# Patient Record
Sex: MALE | Race: WHITE | NOT HISPANIC OR LATINO | Employment: OTHER | URBAN - METROPOLITAN AREA
[De-identification: names, ages, dates, MRNs, and addresses within clinical notes are randomized per-mention and may not be internally consistent; named-entity substitution may affect disease eponyms.]

---

## 2023-11-01 ENCOUNTER — TELEPHONE (OUTPATIENT)
Dept: GASTROENTEROLOGY | Facility: CLINIC | Age: 68
End: 2023-11-01

## 2023-11-01 ENCOUNTER — CONSULT (OUTPATIENT)
Dept: GASTROENTEROLOGY | Facility: CLINIC | Age: 68
End: 2023-11-01
Payer: MEDICARE

## 2023-11-01 VITALS
SYSTOLIC BLOOD PRESSURE: 150 MMHG | DIASTOLIC BLOOD PRESSURE: 90 MMHG | BODY MASS INDEX: 31.64 KG/M2 | WEIGHT: 221 LBS | HEIGHT: 70 IN

## 2023-11-01 DIAGNOSIS — Z87.898 HISTORY OF DYSPHAGIA: ICD-10-CM

## 2023-11-01 DIAGNOSIS — Z86.010 HISTORY OF COLON POLYPS: ICD-10-CM

## 2023-11-01 DIAGNOSIS — K21.9 GASTROESOPHAGEAL REFLUX DISEASE, UNSPECIFIED WHETHER ESOPHAGITIS PRESENT: ICD-10-CM

## 2023-11-01 DIAGNOSIS — K64.9 HEMORRHOIDS, UNSPECIFIED HEMORRHOID TYPE: ICD-10-CM

## 2023-11-01 DIAGNOSIS — K62.5 RECTAL BLEEDING: Primary | ICD-10-CM

## 2023-11-01 PROCEDURE — 99204 OFFICE O/P NEW MOD 45 MIN: CPT | Performed by: NURSE PRACTITIONER

## 2023-11-01 RX ORDER — LEVOTHYROXINE SODIUM 88 UG/1
88 TABLET ORAL DAILY
COMMUNITY

## 2023-11-01 RX ORDER — POLYETHYLENE GLYCOL 3350 17 G/17G
238 POWDER, FOR SOLUTION ORAL ONCE
Qty: 238 G | Refills: 0 | Status: SHIPPED | OUTPATIENT
Start: 2023-11-01 | End: 2023-11-01

## 2023-11-01 RX ORDER — FENOFIBRATE 48 MG/1
48 TABLET, COATED ORAL DAILY
COMMUNITY

## 2023-11-01 RX ORDER — PROPRANOLOL HCL 60 MG
60 CAPSULE, EXTENDED RELEASE 24HR ORAL DAILY
COMMUNITY

## 2023-11-01 RX ORDER — OMEPRAZOLE 20 MG/1
20 CAPSULE, DELAYED RELEASE ORAL DAILY
COMMUNITY

## 2023-11-01 RX ORDER — SIMVASTATIN 20 MG
20 TABLET ORAL
COMMUNITY

## 2023-11-01 NOTE — TELEPHONE ENCOUNTER
Scheduled date of colonoscopy (as of today): 11/7/23  Physician performing colonoscopy:Dr Marj Valdez  Location of colonoscopy: 11/7/23  Bowel prep reviewed with patient: miralax  Instructions reviewed with patient by: gave patient instructions  Clearances: No

## 2023-11-01 NOTE — PROGRESS NOTES
[facility] - Outpatient Consultation  Vanessa Ladd 70 y.o. male MRN: [id number]  Encounter: [id number]    ASSESSMENT AND PLAN:      1. Rectal bleeding  2. Hemorrhoids, unspecified hemorrhoid type  Patient states he has been experiencing rectal bleeding for quite some time and he is aware he does have external hemorrhoids. He has been using witch hazel wipes. Patient states he has been also recently seen increased blood in the toilet bowl. Patient states since starting Kegel exercises he noticed a decrease in the mucoid old drainage that has required him to wear a pad as well. Bleeding likely related to patient's hemorrhoids and less likely diverticular related. - Colonoscopy scheduled at [facility])  - CBC and differential; Future  - Adequate fiber and fluids, patient education on adequate fiber intake attached to discharge summary  -Continue Kegel exercises. ,  May consider anal manometry if symptoms persist or worsen.  -Can use Preparation H OTC as directed, also Tucks pads. 3.  Gastroesophageal reflux disease  4. History of dysphagia  Patient states he had an EGD years ago after having episode of dysphagia. States he has not had any difficulty with swallowing or acid reflux symptoms. He is currently taking omeprazole 20 mg daily and denies breakthrough or alarm symptoms. 5. History of colon polyps  Colonoscopy 2015; patient states his colonoscopy was done in the [facility] and he believes he was told he had polyps and hemorrhoids. - Colonoscopy scheduled at [facility]  - polyethylene glycol (MiraLax) 17 GM/SCOOP powder; Take 238 g by mouth once for 1 dose Take 238 g my mouth.  Use as directed  Dispense: 238 g; Refill: 0      Followup Appointment: 3 weeks after procedures  ______________________________________________________________________    Chief Complaint   Patient presents with    Rectal Bleeding       HPI:   79-year-old male with no significant medical history presents with rectal bleeding and mucoid discharge after defecation. Patient states he has been putting off being seen for rectal bleeding for quite some time. Patient states he does believe he has an external hemorrhoid. He states recently he has been noting increased blood in the toilet bowl. He also states he has to wear a pad due to the mucoid all drainage after defecation. Patient denies taking anticoagulation medications or increased NSAID use. Patient states he has started doing Kegel exercises and believes they are helping to reduce the mucus that he was noticing after bowel movements and present in the pads he is wearing. On exam, patient denies nausea, vomiting or abdominal pain. States his acid reflux symptoms are well controlled with omeprazole 20 mg daily. States he is having daily normal bowel movements. Denies melena. Patient's last colonoscopy was 2015 in the [facility]. He believes he did have hemorrhoids and polyps at that time. Historical Information   Past Medical History:   Diagnosis Date    Clotting disorder ([address]) 2015    Occasional Blood in Stool; Recent Mucus following Bowel Movements    Colon polyp 2015    Removed during last Colon    Esophageal stricture 2002    Upper GI Endoscopy - corrected?     GERD (gastroesophageal reflux disease) 2002    Under Control with Meds    Hernia 1977    Corrective Surgery 1977     Past Surgical History:   Procedure Laterality Date    COLONOSCOPY  2015    [address] [facility]    UPPER GASTROINTESTINAL ENDOSCOPY  2002     Social History     Substance and Sexual Activity   Alcohol Use Yes    Alcohol/week: 2.0 standard drinks of alcohol    Types: 2 Cans of beer per week    Comment: Occasional cocktail - special event     Social History     Substance and Sexual Activity   Drug Use Never     Social History     Tobacco Use   Smoking Status Former    Packs/day: 1.50    Years: 20.00    Total pack years: 30.00    Types: Cigarettes    Start date: 1972    Quit date: 1994    Years since quittin.8   Smokeless Tobacco Never     Family History   Problem Relation Age of Onset    Colon polyps Father         Benign    Diabetes Father         Managed    Stroke Father     Stomach cancer Maternal Grandmother     Cancer Brother         Prostate    Colon polyps Brother         Benign    Diabetes Brother         Injections    Early death Brother         Autopsy not performed (age 54)    Mental illness Brother         PTSD ([employer] [facility])       Meds/Allergies     Current Outpatient Medications:     fenofibrate (TRICOR) 48 mg tablet    levothyroxine (Synthroid) 88 mcg tablet    omeprazole (PriLOSEC) 20 mg delayed release capsule    polyethylene glycol (MiraLax) 17 GM/SCOOP powder    propranolol (INDERAL LA) 60 mg 24 hr capsule    simvastatin (ZOCOR) 20 mg tablet    No Known Allergies    PHYSICAL EXAM:    Blood pressure 150/90, height 5' 10" (1.778 m), weight 100 kg (221 lb). Body mass index is 31.71 kg/m². Normal exam    General Appearance: NAD, cooperative, alert  Eyes: Anicteric, PERRLA, EOMI  ENT:  Normocephalic, atraumatic, normal mucosa. Neck:  Supple, symmetrical, trachea midline,   Resp:  Clear to auscultation bilaterally; no rales, rhonchi or wheezing; respirations unlabored   CV:  S1 S2, Regular rate and rhythm; no murmur, rub, or gallop. GI:  Soft, non-tender, non-distended; normal bowel sounds; no masses, no organomegaly   Rectal: Deferred  Musculoskeletal: No cyanosis, clubbing or edema. Normal ROM.   Skin:  No jaundice, rashes, or lesions   Heme/Lymph: No palpable cervical lymphadenopathy  Psych: Normal affect, good eye contact  Neuro: No gross deficits, AAOx3    Lab Results:   No results found for: "WBC", "HGB", "HCT", "MCV", "PLT"  No results found for: "NA", "K", "CL", "CO2", "ANIONGAP", "BUN", "CREATININE", "GLUCOSE", "GLUF", "CALCIUM", "CORRECTEDCA", "AST", "ALT", "ALKPHOS", "PROT", "BILITOT", "EGFR"  No results found for: "IRON", "TIBC", "FERRITIN"  No results found for: "LIPASE"    Radiology Results:   No results found. REVIEW OF SYSTEMS:    CONSTITUTIONAL: Denies any fever, chills, rigors, and weight loss. HEENT: No earache or tinnitus. Denies hearing loss or visual disturbances. CARDIOVASCULAR: No chest pain or palpitations. RESPIRATORY: Denies any cough, hemoptysis, shortness of breath or dyspnea on exertion. GASTROINTESTINAL: As noted in the History of Present Illness. GENITOURINARY: No problems with urination. Denies any hematuria or dysuria. NEUROLOGIC: No dizziness or vertigo, denies headaches. MUSCULOSKELETAL: Denies any muscle or joint pain. SKIN: Denies skin rashes or itching. ENDOCRINE: Denies excessive thirst. Denies intolerance to heat or cold. PSYCHOSOCIAL: Denies depression or anxiety. Denies any recent memory loss.

## 2023-11-01 NOTE — TELEPHONE ENCOUNTER
Pt is scheduled for November 7, 2023 with Dr Luis Keen in [address] [place] [address]

## 2023-11-01 NOTE — PATIENT INSTRUCTIONS
20 to 25 g fiber per day  Adequate fluid foods    May use Preparation H over-the-counter or Tucks pads/witch hazel as well.

## 2023-11-01 NOTE — H&P (VIEW-ONLY)
[facility] - Outpatient Consultation  Krissy Winkler 70 y.o. male MRN: [id number]  Encounter: [id number]    ASSESSMENT AND PLAN:      1. Rectal bleeding  2. Hemorrhoids, unspecified hemorrhoid type  Patient states he has been experiencing rectal bleeding for quite some time and he is aware he does have external hemorrhoids. He has been using witch hazel wipes. Patient states he has been also recently seen increased blood in the toilet bowl. Patient states since starting Kegel exercises he noticed a decrease in the mucoid old drainage that has required him to wear a pad as well. Bleeding likely related to patient's hemorrhoids and less likely diverticular related. - Colonoscopy scheduled at [facility])  - CBC and differential; Future  - Adequate fiber and fluids, patient education on adequate fiber intake attached to discharge summary  -Continue Kegel exercises. ,  May consider anal manometry if symptoms persist or worsen.  -Can use Preparation H OTC as directed, also Tucks pads. 3.  Gastroesophageal reflux disease  4. History of dysphagia  Patient states he had an EGD years ago after having episode of dysphagia. States he has not had any difficulty with swallowing or acid reflux symptoms. He is currently taking omeprazole 20 mg daily and denies breakthrough or alarm symptoms. 5. History of colon polyps  Colonoscopy 2015; patient states his colonoscopy was done in the [facility] and he believes he was told he had polyps and hemorrhoids. - Colonoscopy scheduled at [facility]  - polyethylene glycol (MiraLax) 17 GM/SCOOP powder; Take 238 g by mouth once for 1 dose Take 238 g my mouth.  Use as directed  Dispense: 238 g; Refill: 0      Followup Appointment: 3 weeks after procedures  ______________________________________________________________________    Chief Complaint   Patient presents with    Rectal Bleeding       HPI:   68-year-old male with no significant medical history presents with rectal bleeding and mucoid discharge after defecation. Patient states he has been putting off being seen for rectal bleeding for quite some time. Patient states he does believe he has an external hemorrhoid. He states recently he has been noting increased blood in the toilet bowl. He also states he has to wear a pad due to the mucoid all drainage after defecation. Patient denies taking anticoagulation medications or increased NSAID use. Patient states he has started doing Kegel exercises and believes they are helping to reduce the mucus that he was noticing after bowel movements and present in the pads he is wearing. On exam, patient denies nausea, vomiting or abdominal pain. States his acid reflux symptoms are well controlled with omeprazole 20 mg daily. States he is having daily normal bowel movements. Denies melena. Patient's last colonoscopy was 2015 in the [facility]. He believes he did have hemorrhoids and polyps at that time. Historical Information   Past Medical History:   Diagnosis Date    Clotting disorder ([address]) 2015    Occasional Blood in Stool; Recent Mucus following Bowel Movements    Colon polyp 2015    Removed during last Colon    Esophageal stricture 2002    Upper GI Endoscopy - corrected?     GERD (gastroesophageal reflux disease) 2002    Under Control with Meds    Hernia 1977    Corrective Surgery 1977     Past Surgical History:   Procedure Laterality Date    COLONOSCOPY  2015    [address] [facility]    UPPER GASTROINTESTINAL ENDOSCOPY  2002     Social History     Substance and Sexual Activity   Alcohol Use Yes    Alcohol/week: 2.0 standard drinks of alcohol    Types: 2 Cans of beer per week    Comment: Occasional cocktail - special event     Social History     Substance and Sexual Activity   Drug Use Never     Social History     Tobacco Use   Smoking Status Former    Packs/day: 1.50    Years: 20.00    Total pack years: 30.00    Types: Cigarettes    Start date: 1972    Quit date: 1994    Years since quittin.8   Smokeless Tobacco Never     Family History   Problem Relation Age of Onset    Colon polyps Father         Benign    Diabetes Father         Managed    Stroke Father     Stomach cancer Maternal Grandmother     Cancer Brother         Prostate    Colon polyps Brother         Benign    Diabetes Brother         Injections    Early death Brother         Autopsy not performed (age 54)    Mental illness Brother         PTSD ([employer] [facility] [employer] [facility])       Meds/Allergies     Current Outpatient Medications:     fenofibrate (TRICOR) 48 mg tablet    levothyroxine (Synthroid) 88 mcg tablet    omeprazole (PriLOSEC) 20 mg delayed release capsule    polyethylene glycol (MiraLax) 17 GM/SCOOP powder    propranolol (INDERAL LA) 60 mg 24 hr capsule    simvastatin (ZOCOR) 20 mg tablet    No Known Allergies    PHYSICAL EXAM:    Blood pressure 150/90, height 5' 10" (1.778 m), weight 100 kg (221 lb). Body mass index is 31.71 kg/m². Normal exam    General Appearance: NAD, cooperative, alert  Eyes: Anicteric, PERRLA, EOMI  ENT:  Normocephalic, atraumatic, normal mucosa. Neck:  Supple, symmetrical, trachea midline,   Resp:  Clear to auscultation bilaterally; no rales, rhonchi or wheezing; respirations unlabored   CV:  S1 S2, Regular rate and rhythm; no murmur, rub, or gallop. GI:  Soft, non-tender, non-distended; normal bowel sounds; no masses, no organomegaly   Rectal: Deferred  Musculoskeletal: No cyanosis, clubbing or edema. Normal ROM.   Skin:  No jaundice, rashes, or lesions   Heme/Lymph: No palpable cervical lymphadenopathy  Psych: Normal affect, good eye contact  Neuro: No gross deficits, AAOx3    Lab Results:   No results found for: "WBC", "HGB", "HCT", "MCV", "PLT"  No results found for: "NA", "K", "CL", "CO2", "ANIONGAP", "BUN", "CREATININE", "GLUCOSE", "GLUF", "CALCIUM", "CORRECTEDCA", "AST", "ALT", "ALKPHOS", "PROT", "BILITOT", "EGFR"  No results found for: "IRON", "TIBC", "FERRITIN"  No results found for: "LIPASE"    Radiology Results:   No results found. REVIEW OF SYSTEMS:    CONSTITUTIONAL: Denies any fever, chills, rigors, and weight loss. HEENT: No earache or tinnitus. Denies hearing loss or visual disturbances. CARDIOVASCULAR: No chest pain or palpitations. RESPIRATORY: Denies any cough, hemoptysis, shortness of breath or dyspnea on exertion. GASTROINTESTINAL: As noted in the History of Present Illness. GENITOURINARY: No problems with urination. Denies any hematuria or dysuria. NEUROLOGIC: No dizziness or vertigo, denies headaches. MUSCULOSKELETAL: Denies any muscle or joint pain. SKIN: Denies skin rashes or itching. ENDOCRINE: Denies excessive thirst. Denies intolerance to heat or cold. PSYCHOSOCIAL: Denies depression or anxiety. Denies any recent memory loss.

## 2023-11-02 LAB
BASOPHILS # BLD AUTO: 0.1 X10E3/UL (ref 0–0.2)
BASOPHILS NFR BLD AUTO: 1 %
EOSINOPHIL # BLD AUTO: 0.3 X10E3/UL (ref 0–0.4)
EOSINOPHIL NFR BLD AUTO: 4 %
ERYTHROCYTE [DISTWIDTH] IN BLOOD BY AUTOMATED COUNT: 15.1 % (ref 11.6–15.4)
HCT VFR BLD AUTO: 46.8 % (ref 37.5–51)
HGB BLD-MCNC: 14.7 G/DL (ref 13–17.7)
IMM GRANULOCYTES # BLD: 0 X10E3/UL (ref 0–0.1)
IMM GRANULOCYTES NFR BLD: 1 %
LYMPHOCYTES # BLD AUTO: 1.5 X10E3/UL (ref 0.7–3.1)
LYMPHOCYTES NFR BLD AUTO: 25 %
MCH RBC QN AUTO: 27.4 PG (ref 26.6–33)
MCHC RBC AUTO-ENTMCNC: 31.4 G/DL (ref 31.5–35.7)
MCV RBC AUTO: 87 FL (ref 79–97)
MONOCYTES # BLD AUTO: 0.6 X10E3/UL (ref 0.1–0.9)
MONOCYTES NFR BLD AUTO: 9 %
NEUTROPHILS # BLD AUTO: 3.7 X10E3/UL (ref 1.4–7)
NEUTROPHILS NFR BLD AUTO: 60 %
PLATELET # BLD AUTO: 247 X10E3/UL (ref 150–450)
RBC # BLD AUTO: 5.36 X10E6/UL (ref 4.14–5.8)
WBC # BLD AUTO: 6.2 X10E3/UL (ref 3.4–10.8)

## 2023-11-07 ENCOUNTER — APPOINTMENT (OUTPATIENT)
Dept: LAB | Facility: HOSPITAL | Age: 68
End: 2023-11-07
Payer: MEDICARE

## 2023-11-07 ENCOUNTER — HOSPITAL ENCOUNTER (OUTPATIENT)
Dept: CT IMAGING | Facility: HOSPITAL | Age: 68
Discharge: HOME | End: 2023-11-07
Attending: INTERNAL MEDICINE
Payer: MEDICARE

## 2023-11-07 ENCOUNTER — HOSPITAL ENCOUNTER (OUTPATIENT)
Dept: GASTROENTEROLOGY | Facility: AMBULATORY SURGERY CENTER | Age: 68
Discharge: HOME | End: 2023-11-07
Payer: MEDICARE

## 2023-11-07 ENCOUNTER — ANESTHESIA EVENT (OUTPATIENT)
Dept: GASTROENTEROLOGY | Facility: AMBULATORY SURGERY CENTER | Age: 68
End: 2023-11-07

## 2023-11-07 ENCOUNTER — NURSE TRIAGE (OUTPATIENT)
Age: 68
End: 2023-11-07

## 2023-11-07 ENCOUNTER — ANESTHESIA (OUTPATIENT)
Dept: GASTROENTEROLOGY | Facility: AMBULATORY SURGERY CENTER | Age: 68
End: 2023-11-07

## 2023-11-07 VITALS
HEART RATE: 82 BPM | RESPIRATION RATE: 24 BRPM | SYSTOLIC BLOOD PRESSURE: 126 MMHG | TEMPERATURE: 97 F | WEIGHT: 221 LBS | OXYGEN SATURATION: 96 % | HEIGHT: 70 IN | BODY MASS INDEX: 31.64 KG/M2 | DIASTOLIC BLOOD PRESSURE: 67 MMHG

## 2023-11-07 DIAGNOSIS — C18.7 MALIGNANT NEOPLASM OF SIGMOID COLON (HCC): ICD-10-CM

## 2023-11-07 DIAGNOSIS — C18.7 MALIGNANT NEOPLASM OF SIGMOID COLON (HCC): Primary | ICD-10-CM

## 2023-11-07 DIAGNOSIS — Z86.010 HISTORY OF COLON POLYPS: ICD-10-CM

## 2023-11-07 DIAGNOSIS — K62.5 RECTAL BLEEDING: ICD-10-CM

## 2023-11-07 LAB
ANION GAP SERPL CALCULATED.3IONS-SCNC: 9 MMOL/L
BUN SERPL-MCNC: 10 MG/DL (ref 5–25)
CALCIUM SERPL-MCNC: 9.4 MG/DL (ref 8.4–10.2)
CHLORIDE SERPL-SCNC: 102 MMOL/L (ref 96–108)
CO2 SERPL-SCNC: 26 MMOL/L (ref 21–32)
CREAT SERPL-MCNC: 0.96 MG/DL (ref 0.6–1.3)
GFR SERPL CREATININE-BSD FRML MDRD: 80 ML/MIN/1.73SQ M
GLUCOSE SERPL-MCNC: 101 MG/DL (ref 65–140)
POTASSIUM SERPL-SCNC: 3.3 MMOL/L (ref 3.5–5.3)
SODIUM SERPL-SCNC: 137 MMOL/L (ref 135–147)

## 2023-11-07 PROCEDURE — 45381 COLONOSCOPY SUBMUCOUS NJX: CPT | Performed by: INTERNAL MEDICINE

## 2023-11-07 PROCEDURE — 45380 COLONOSCOPY AND BIOPSY: CPT | Performed by: INTERNAL MEDICINE

## 2023-11-07 PROCEDURE — 71260 CT THORAX DX C+: CPT

## 2023-11-07 PROCEDURE — G1004 CDSM NDSC: HCPCS

## 2023-11-07 PROCEDURE — 80048 BASIC METABOLIC PNL TOTAL CA: CPT

## 2023-11-07 PROCEDURE — 74177 CT ABD & PELVIS W/CONTRAST: CPT

## 2023-11-07 PROCEDURE — 88341 IMHCHEM/IMCYTCHM EA ADD ANTB: CPT | Performed by: PATHOLOGY

## 2023-11-07 PROCEDURE — 36415 COLL VENOUS BLD VENIPUNCTURE: CPT

## 2023-11-07 PROCEDURE — 88342 IMHCHEM/IMCYTCHM 1ST ANTB: CPT | Performed by: PATHOLOGY

## 2023-11-07 PROCEDURE — 88305 TISSUE EXAM BY PATHOLOGIST: CPT | Performed by: PATHOLOGY

## 2023-11-07 RX ORDER — ACETAMINOPHEN 500 MG
500 TABLET ORAL EVERY 6 HOURS PRN
COMMUNITY

## 2023-11-07 RX ORDER — SODIUM CHLORIDE, SODIUM LACTATE, POTASSIUM CHLORIDE, CALCIUM CHLORIDE 600; 310; 30; 20 MG/100ML; MG/100ML; MG/100ML; MG/100ML
50 INJECTION, SOLUTION INTRAVENOUS CONTINUOUS
Status: DISCONTINUED | OUTPATIENT
Start: 2023-11-07 | End: 2023-11-11 | Stop reason: HOSPADM

## 2023-11-07 RX ORDER — PROPOFOL 10 MG/ML
INJECTION, EMULSION INTRAVENOUS AS NEEDED
Status: DISCONTINUED | OUTPATIENT
Start: 2023-11-07 | End: 2023-11-07

## 2023-11-07 RX ADMIN — PROPOFOL 50 MG: 10 INJECTION, EMULSION INTRAVENOUS at 11:14

## 2023-11-07 RX ADMIN — PROPOFOL 100 MG: 10 INJECTION, EMULSION INTRAVENOUS at 11:10

## 2023-11-07 RX ADMIN — SODIUM CHLORIDE, SODIUM LACTATE, POTASSIUM CHLORIDE, CALCIUM CHLORIDE 50 ML/HR: 600; 310; 30; 20 INJECTION, SOLUTION INTRAVENOUS at 10:48

## 2023-11-07 RX ADMIN — PROPOFOL 100 MG: 10 INJECTION, EMULSION INTRAVENOUS at 11:20

## 2023-11-07 RX ADMIN — SODIUM CHLORIDE, SODIUM LACTATE, POTASSIUM CHLORIDE, CALCIUM CHLORIDE: 600; 310; 30; 20 INJECTION, SOLUTION INTRAVENOUS at 11:27

## 2023-11-07 RX ADMIN — IOHEXOL 100 ML: 350 INJECTION, SOLUTION INTRAVENOUS at 16:25

## 2023-11-07 RX ADMIN — PROPOFOL 100 MG: 10 INJECTION, EMULSION INTRAVENOUS at 11:09

## 2023-11-07 NOTE — ANESTHESIA PREPROCEDURE EVALUATION
Procedure:  COLONOSCOPY    Relevant Problems   No relevant active problems      GERD  HLD  H/o esophageal stricture  Hypothyroid  AMAN + Cpap    Physical Exam    Airway    Mallampati score: II  TM Distance: <3 FB  Neck ROM: full     Dental   Comment: None loose     Cardiovascular      Pulmonary      Other Findings        Anesthesia Plan  ASA Score- 2     Anesthesia Type- IV sedation with anesthesia with ASA Monitors. Additional Monitors:     Airway Plan:     Comment: Last of PO bowel prep: 06:15    Patient educated on the possibility for awareness under sedation and of the possibility of airway intervention in the event of an airway or procedural emergency  . Plan Factors-Exercise tolerance (METS): >4 METS. Chart reviewed. Patient summary reviewed. Patient is not a current smoker. Induction- intravenous. Postoperative Plan-     Informed Consent- Anesthetic plan and risks discussed with patient. I personally reviewed this patient with the CRNA. Discussed and agreed on the Anesthesia Plan with the CRNA. Efrain Sepulveda

## 2023-11-07 NOTE — INTERVAL H&P NOTE
H&P reviewed. After examining the patient I find no changes in the patients condition since the H&P had been written.     Vitals:    11/07/23 1038   BP: 149/82   Pulse: 84   Resp: 20   Temp: (!) 97 °F (36.1 °C)   SpO2: 97%

## 2023-11-07 NOTE — ANESTHESIA POSTPROCEDURE EVALUATION
Post-Op Assessment Note    CV Status:  Stable  Pain Score: 0    Pain management: adequate     Mental Status:  Alert and awake   Hydration Status:  Euvolemic   PONV Controlled:  Controlled   Airway Patency:  Patent      Post Op Vitals Reviewed: Yes      Staff: CRNA         No notable events documented.     BP   111/68   Temp  98   Pulse  74   Resp   16   SpO2   93

## 2023-11-07 NOTE — TELEPHONE ENCOUNTER
Patient called in stating he is scheduled for stat CT scan at [address] [place] [address] 4 pm. He was at facility earlier today for lab draw and states he was provided with container labeled sterile water for CT prep. I advised patient report to hospital now and if problem with prep they will address it at radiology department, while on call with patient his wife was on with radiology and he stated everything is straightened out now and appreciated the help. Answer Assessment - Initial Assessment Questions  1. REASON FOR CALL or QUESTION: "What is your reason for calling today?" or "How can I best help you?" or "What question do you have that I can help answer?"      Patient called regarding issue with prep provided by radiology dept for CT scan scheduled today. Protocols used:  Information Only Call - No Triage-ADULT-[place]

## 2023-11-09 PROCEDURE — 88341 IMHCHEM/IMCYTCHM EA ADD ANTB: CPT | Performed by: PATHOLOGY

## 2023-11-09 PROCEDURE — 88342 IMHCHEM/IMCYTCHM 1ST ANTB: CPT | Performed by: PATHOLOGY

## 2023-11-09 PROCEDURE — 88305 TISSUE EXAM BY PATHOLOGIST: CPT | Performed by: PATHOLOGY

## 2023-11-10 ENCOUNTER — PATIENT OUTREACH (OUTPATIENT)
Dept: HEMATOLOGY ONCOLOGY | Facility: CLINIC | Age: 68
End: 2023-11-10

## 2023-11-10 DIAGNOSIS — C18.7 MALIGNANT NEOPLASM OF SIGMOID COLON (HCC): Primary | ICD-10-CM

## 2023-11-10 NOTE — TELEPHONE ENCOUNTER
Patients GI provider:  Dr. Asif Left    Number to return call: [phone]    Reason for call: Pt calling to advise doc got back to him. Wanted to call back and apologize for the disconnect.     Scheduled procedure/appointment date if applicable: Apt 25/07/0184

## 2023-11-10 NOTE — PROGRESS NOTES
Very pleasant 71 yo male who was referred to us by Dr. Gerda Melendrez, [address] [place] [address] location, for diagnosis of colon cancer. The pt's path report shows "at least intramucosal adenocarcinoma" and I wanted to proceed w referring the pt to see one of our colorectal surgeons to set him up for a FS followed by office visit (consult) for management of his new cancer. The pt has already completed CT CAP and no signs of definitive mets noted. When calling the pt I introduced myself and explained I work in care coordination. When explaining what the next steps in his work up included (CEA level and FS) he mentioned he was not really sure if he would be cont treatment w our physicians at [address] He mentioned knowing about a surgeon (Dr. Chiquis Simpson) and his desire to go to [facility]. I did mention to him that if it was location that is an issue we do have a cancer center down at the  region and we have a surgeon who works in that location too that we could connect him to rather than [facility]. He was unsure and therefore I explained that I could touch base w him on Monday and give him the weekend to think things over and do his research before making his decision. The pt was agreeable to this plan and stated he would f/u w me on Mon, he took my contact information .

## 2023-11-10 NOTE — TELEPHONE ENCOUNTER
Pt calling in for CT and bioipsy results. He would like to speak with Dr. Rosibel Kieta for plan of care.  Please call pt back

## 2023-11-13 ENCOUNTER — PATIENT OUTREACH (OUTPATIENT)
Dept: HEMATOLOGY ONCOLOGY | Facility: CLINIC | Age: 68
End: 2023-11-13

## 2023-11-13 NOTE — PROGRESS NOTES
Phone outreach to f/u w the pt today to see where he has decided to further pursue tx. The pt states that he is going to be seen at "[facility]" and he will be continuing his care closer to where he lives. He thanked me for my help, I told him to reach out if things change. He has my contact info.

## 2023-11-15 ENCOUNTER — TELEPHONE (OUTPATIENT)
Age: 68
End: 2023-11-15

## 2023-11-15 NOTE — TELEPHONE ENCOUNTER
Patients GI provider:  Dr. Smith [place]    Number to return call: ( [phone]    Reason for call: Pt calling asking for immerging reports from ct and colonoscopy from  11/7/2023. Pt is asking for this on a disc to take to surgeon.      Scheduled procedure/appointment date if applicable: Apt/procedure

## 2023-11-16 NOTE — TELEPHONE ENCOUNTER
Please advise patient to call Radiology at [phone] to request the disk for CT. I sent a message to Manjeet Daley to find out about the colonoscopy records.

## 2023-11-20 PROBLEM — C18.7 MALIGNANT NEOPLASM OF SIGMOID COLON (CMS/HCC): Status: ACTIVE | Noted: 2023-11-20

## 2023-11-20 NOTE — ASSESSMENT & PLAN NOTE
Adenocarcinoma of the sigmoid colon    CT: 11/7/2023    FFC: 11/7/2023      He is a cancer of the distal sigmoid colon.  There is a tattoo immediately distal to this.  The plan will be for a low anterior resection anastomosis could be above the rectovesicular fold.  There is no sign of any metastatic disease we will look at his CT scan there is a lot of edema of the sigmoid colon which looks like previous diverticulitis.  The tumor itself did not look too sizable or advanced endoscopically or radiographically.    I speak with him about the risk of bleed infection abscess incontinence anastomotic leak bladder and sexual function even death.  He understands wished to proceed we will make arrangements for this.

## 2023-11-21 ENCOUNTER — PREP FOR CASE (OUTPATIENT)
Dept: COLORECTAL SURGERY | Facility: CLINIC | Age: 68
End: 2023-11-21

## 2023-11-21 ENCOUNTER — OFFICE VISIT (OUTPATIENT)
Dept: COLORECTAL SURGERY | Facility: CLINIC | Age: 68
End: 2023-11-21
Payer: MEDICARE

## 2023-11-21 VITALS — HEIGHT: 70 IN | WEIGHT: 225 LBS | BODY MASS INDEX: 32.21 KG/M2

## 2023-11-21 DIAGNOSIS — C18.7 MALIGNANT NEOPLASM OF SIGMOID COLON (CMS/HCC): ICD-10-CM

## 2023-11-21 DIAGNOSIS — C18.7 MALIGNANT NEOPLASM OF SIGMOID COLON (CMS/HCC): Primary | ICD-10-CM

## 2023-11-21 PROCEDURE — 99205 OFFICE O/P NEW HI 60 MIN: CPT | Mod: 25 | Performed by: COLON & RECTAL SURGERY

## 2023-11-21 PROCEDURE — 45330 DIAGNOSTIC SIGMOIDOSCOPY: CPT | Performed by: COLON & RECTAL SURGERY

## 2023-11-21 RX ORDER — BENZONATATE 100 MG/1
CAPSULE ORAL
COMMUNITY
Start: 2023-11-17

## 2023-11-21 RX ORDER — PROPRANOLOL HYDROCHLORIDE 40 MG/5ML
SOLUTION ORAL
COMMUNITY
Start: 2005-01-01 | End: 2023-12-05

## 2023-11-21 RX ORDER — OMEPRAZOLE 20 MG/1
20 CAPSULE, DELAYED RELEASE ORAL
COMMUNITY
Start: 2023-09-26

## 2023-11-21 RX ORDER — DOXYCYCLINE 100 MG/1
CAPSULE ORAL
COMMUNITY
Start: 2023-11-14 | End: 2023-12-05

## 2023-11-21 RX ORDER — FLUTICASONE PROPIONATE 50 MCG
2 SPRAY, SUSPENSION (ML) NASAL DAILY
COMMUNITY

## 2023-11-21 RX ORDER — LEVOTHYROXINE SODIUM 88 UG/1
88 TABLET ORAL
COMMUNITY
Start: 2023-09-28

## 2023-11-21 RX ORDER — DOXYCYCLINE 100 MG/1
CAPSULE ORAL
Qty: 2 CAPSULE | Refills: 0 | Status: SHIPPED | OUTPATIENT
Start: 2023-11-21 | End: 2023-12-05

## 2023-11-21 RX ORDER — FENOFIBRATE 48 MG/1
48 TABLET ORAL DAILY
COMMUNITY
Start: 2023-09-28

## 2023-11-21 ASSESSMENT — ENCOUNTER SYMPTOMS
MUSCULOSKELETAL NEGATIVE: 1
PSYCHIATRIC NEGATIVE: 1
CARDIOVASCULAR NEGATIVE: 1
GASTROINTESTINAL NEGATIVE: 1
RESPIRATORY NEGATIVE: 1
CONSTITUTIONAL NEGATIVE: 1

## 2023-11-21 NOTE — H&P (VIEW-ONLY)
"History and Physical    Patient was referred by his daughter in law Amber Leslie.   The diagnosis on referral was  primary cancer of the  sigmoid colon.    The presenting symptom was  draingae of the  anus.    HPI:    Mr. Soliz is a 68 year old man here for evaluation of newly diagnosed sigmoid adenocarcinoma. He was due for a colonoscopy in 2025 however this summer began to have heavy mucus drainage from his anus, sometimes staining through his underwear. He has always had occasional blood after wiping however the mucus was new as of a few months ago. He was concerned and scheduled a colonoscopy. They found a mass in his sigmoid colon which was tattooed distally. The pathology showed \"atleast intramucosal adenocarcinoma\". He has two soft bowel movements daily without straining which is unchanged. He does not have any fecal incontinence or issues with control. He has not had any abdominal pain or weight loss.     Family history significant for brother with prostate cancer, father with gastric cancer (?)    Studies included: FFC: 11/7/23 CT Scan:11/7/23  Be:    GI issues included:   No nausea,  no vomiting, no bloating, no abdominal pain, bowel habits were unchanged, weight loss none,  Patient goes to the bathroom twice daily .  Patient experiences fecal accidents  never,    Delivery History:  ,  not applicable,    Pelvic Floor Disorders:  None,    PMH:  Previous Cancer:  No  Site of Previous Cancer: Not Applicable  Previous Pelvic Radiation:  No,   other      Cardiovascular History: MI:  No,  Angina:  No, CHF:  No,  HTN:  No,  Valve Disease:  No,  AFIB  No,  Other: No    Pulmonary Disease: COPD:  No,  Asthma:  No, Emphysema:  No, Pulmonary HTN:  No,  Other: no    Endocrine:  IDDM:  No,  NIDDM:  No, Other: no    Coagulation Disorders:  DVT:  no, Located in the N/A,  Pulmonary Embolus:  No, Clotting Factor Deficiency:  No, Thrombocytopenia:  No,      Morbid Obesity:  Yes  Elevated Cholesterol:  Yes    PSH " (ABDOMINAL): inguinal hernia repair.     MEDICATIONS:     Current Outpatient Medications:   •  benzonatate (TESSALON) 100 mg capsule, take 1 capsule by mouth three times a day if needed for cough, Disp: , Rfl:   •  doxycycline hyclate (VIBRAMYCIN) 100 mg capsule, take 1 capsule by mouth every 12 hours, Disp: , Rfl:   •  fluticasone propionate (FLONASE) 50 mcg/actuation nasal spray, Administer 2 sprays into affected nostril(s) daily., Disp: , Rfl:   •  levothyroxine (SYNTHROID) 88 mcg tablet, , Disp: , Rfl:   •  omeprazole (PriLOSEC) 20 mg capsule, , Disp: , Rfl:   •  propranoloL 40 mg/5 mL (8 mg/mL) solution, , Disp: , Rfl:   •  simvastatin 20 mg/5 mL (4 mg/mL) suspension, , Disp: , Rfl:   •  TRICOR 48 mg tablet, , Disp: , Rfl:     DRUG ALLERGIES:   No Known Allergies    FAMILY HISTORY:  Family History   Problem Relation Age of Onset   • Stroke Biological Father    • Diabetes Biological Father    • Prostate cancer Biological Brother    • Stomach cancer Maternal Grandmother        SOCIAL HISTORY:  Smoking History:   quit,  Cigarettes in 1995    Alcohol Use: Number of drinks per week: occasional     Social History     Socioeconomic History   • Marital status:      Spouse name: Not on file   • Number of children: Not on file   • Years of education: Not on file   • Highest education level: Not on file   Occupational History   • Not on file   Tobacco Use   • Smoking status: Former     Types: Cigarettes   • Smokeless tobacco: Never   Substance and Sexual Activity   • Alcohol use: Yes   • Drug use: Not on file   • Sexual activity: Not on file   Other Topics Concern   • Not on file   Social History Narrative   • Not on file     Social Determinants of Health     Financial Resource Strain: Not on file   Food Insecurity: Not on file   Transportation Needs: Not on file   Physical Activity: Not on file   Stress: Not on file   Social Connections: Not on file   Intimate Partner Violence: Not on file   Housing Stability: Not  on file       Review of Systems   Constitutional: Negative.    HENT: Negative.    Respiratory: Negative.    Cardiovascular: Negative.    Gastrointestinal: Negative.    Genitourinary: Negative.    Musculoskeletal: Negative.    Skin: Negative.    Psychiatric/Behavioral: Negative.        Physical Exam    Abdominal Exam:   Soft, nontender, diastasis   Perineal Inspection: Normal appearance  Digital Rectal Exam: Normal tone  Fiberoptic Flexible Sigmoidoscopy: Shows an area of tattoo distal to the lesion the tattoo appears to be above the rectosigmoid      IMAGING: Is reviewed there is no evidence personally there is no evidence of metastatic disease to the liver there is some thickening of the sigmoid colon no obvious area of tumor.    Assessment and Plan:   Malignant neoplasm of sigmoid colon (CMS/HCC)  Adenocarcinoma of the sigmoid colon    CT: 11/7/2023    FFC: 11/7/2023      He is a cancer of the distal sigmoid colon.  There is a tattoo immediately distal to this.  The plan will be for a low anterior resection anastomosis could be above the rectovesicular fold.  There is no sign of any metastatic disease we will look at his CT scan there is a lot of edema of the sigmoid colon which looks like previous diverticulitis.  The tumor itself did not look too sizable or advanced endoscopically or radiographically.    I speak with him about the risk of bleed infection abscess incontinence anastomotic leak bladder and sexual function even death.  He understands wished to proceed we will make arrangements for this.        We will have him obtain medical clearance. We will plan for anterior resection/

## 2023-11-21 NOTE — PROGRESS NOTES
"History and Physical    Patient was referred by his daughter in law Amber Leslie.   The diagnosis on referral was  primary cancer of the  sigmoid colon.    The presenting symptom was  draingae of the  anus.    HPI:    Mr. Soliz is a 68 year old man here for evaluation of newly diagnosed sigmoid adenocarcinoma. He was due for a colonoscopy in 2025 however this summer began to have heavy mucus drainage from his anus, sometimes staining through his underwear. He has always had occasional blood after wiping however the mucus was new as of a few months ago. He was concerned and scheduled a colonoscopy. They found a mass in his sigmoid colon which was tattooed distally. The pathology showed \"atleast intramucosal adenocarcinoma\". He has two soft bowel movements daily without straining which is unchanged. He does not have any fecal incontinence or issues with control. He has not had any abdominal pain or weight loss.     Family history significant for brother with prostate cancer, father with gastric cancer (?)    Studies included: FFC: 11/7/23 CT Scan:11/7/23  Be:    GI issues included:   No nausea,  no vomiting, no bloating, no abdominal pain, bowel habits were unchanged, weight loss none,  Patient goes to the bathroom twice daily .  Patient experiences fecal accidents  never,    Delivery History:  ,  not applicable,    Pelvic Floor Disorders:  None,    PMH:  Previous Cancer:  No  Site of Previous Cancer: Not Applicable  Previous Pelvic Radiation:  No,   other      Cardiovascular History: MI:  No,  Angina:  No, CHF:  No,  HTN:  No,  Valve Disease:  No,  AFIB  No,  Other: No    Pulmonary Disease: COPD:  No,  Asthma:  No, Emphysema:  No, Pulmonary HTN:  No,  Other: no    Endocrine:  IDDM:  No,  NIDDM:  No, Other: no    Coagulation Disorders:  DVT:  no, Located in the N/A,  Pulmonary Embolus:  No, Clotting Factor Deficiency:  No, Thrombocytopenia:  No,      Morbid Obesity:  Yes  Elevated Cholesterol:  Yes    PSH " (ABDOMINAL): inguinal hernia repair.     MEDICATIONS:     Current Outpatient Medications:     benzonatate (TESSALON) 100 mg capsule, take 1 capsule by mouth three times a day if needed for cough, Disp: , Rfl:     doxycycline hyclate (VIBRAMYCIN) 100 mg capsule, take 1 capsule by mouth every 12 hours, Disp: , Rfl:     fluticasone propionate (FLONASE) 50 mcg/actuation nasal spray, Administer 2 sprays into affected nostril(s) daily., Disp: , Rfl:     levothyroxine (SYNTHROID) 88 mcg tablet, , Disp: , Rfl:     omeprazole (PriLOSEC) 20 mg capsule, , Disp: , Rfl:     propranoloL 40 mg/5 mL (8 mg/mL) solution, , Disp: , Rfl:     simvastatin 20 mg/5 mL (4 mg/mL) suspension, , Disp: , Rfl:     TRICOR 48 mg tablet, , Disp: , Rfl:     DRUG ALLERGIES:   No Known Allergies    FAMILY HISTORY:  Family History   Problem Relation Age of Onset    Stroke Biological Father     Diabetes Biological Father     Prostate cancer Biological Brother     Stomach cancer Maternal Grandmother        SOCIAL HISTORY:  Smoking History:   quit,  Cigarettes in 1995    Alcohol Use: Number of drinks per week: occasional     Social History     Socioeconomic History    Marital status:      Spouse name: Not on file    Number of children: Not on file    Years of education: Not on file    Highest education level: Not on file   Occupational History    Not on file   Tobacco Use    Smoking status: Former     Types: Cigarettes    Smokeless tobacco: Never   Substance and Sexual Activity    Alcohol use: Yes    Drug use: Not on file    Sexual activity: Not on file   Other Topics Concern    Not on file   Social History Narrative    Not on file     Social Determinants of Health     Financial Resource Strain: Not on file   Food Insecurity: Not on file   Transportation Needs: Not on file   Physical Activity: Not on file   Stress: Not on file   Social Connections: Not on file   Intimate Partner Violence: Not on file   Housing Stability: Not  on file       Review of Systems   Constitutional: Negative.    HENT: Negative.    Respiratory: Negative.    Cardiovascular: Negative.    Gastrointestinal: Negative.    Genitourinary: Negative.    Musculoskeletal: Negative.    Skin: Negative.    Psychiatric/Behavioral: Negative.        Physical Exam    Abdominal Exam:   Soft, nontender, diastasis   Perineal Inspection: Normal appearance  Digital Rectal Exam: Normal tone  Fiberoptic Flexible Sigmoidoscopy: Shows an area of tattoo distal to the lesion the tattoo appears to be above the rectosigmoid      IMAGING: Is reviewed there is no evidence personally there is no evidence of metastatic disease to the liver there is some thickening of the sigmoid colon no obvious area of tumor.    Assessment and Plan:   Malignant neoplasm of sigmoid colon (CMS/HCC)  Adenocarcinoma of the sigmoid colon    CT: 11/7/2023    FFC: 11/7/2023      He is a cancer of the distal sigmoid colon.  There is a tattoo immediately distal to this.  The plan will be for a low anterior resection anastomosis could be above the rectovesicular fold.  There is no sign of any metastatic disease we will look at his CT scan there is a lot of edema of the sigmoid colon which looks like previous diverticulitis.  The tumor itself did not look too sizable or advanced endoscopically or radiographically.    I speak with him about the risk of bleed infection abscess incontinence anastomotic leak bladder and sexual function even death.  He understands wished to proceed we will make arrangements for this.        We will have him obtain medical clearance. We will plan for anterior resection/

## 2023-12-05 ENCOUNTER — APPOINTMENT (OUTPATIENT)
Dept: LAB | Facility: HOSPITAL | Age: 68
End: 2023-12-05
Attending: COLON & RECTAL SURGERY
Payer: MEDICARE

## 2023-12-05 ENCOUNTER — OFFICE VISIT (OUTPATIENT)
Dept: PREADMISSION TESTING | Facility: HOSPITAL | Age: 68
End: 2023-12-05
Attending: COLON & RECTAL SURGERY
Payer: MEDICARE

## 2023-12-05 VITALS
TEMPERATURE: 97.4 F | SYSTOLIC BLOOD PRESSURE: 161 MMHG | RESPIRATION RATE: 16 BRPM | DIASTOLIC BLOOD PRESSURE: 83 MMHG | BODY MASS INDEX: 30.64 KG/M2 | HEIGHT: 70 IN | OXYGEN SATURATION: 97 % | HEART RATE: 56 BPM | WEIGHT: 214 LBS

## 2023-12-05 DIAGNOSIS — C18.7 MALIGNANT NEOPLASM OF SIGMOID COLON (CMS/HCC): ICD-10-CM

## 2023-12-05 DIAGNOSIS — E03.9 HYPOTHYROIDISM, UNSPECIFIED TYPE: ICD-10-CM

## 2023-12-05 DIAGNOSIS — G47.33 OBSTRUCTIVE SLEEP APNEA ON CPAP: ICD-10-CM

## 2023-12-05 DIAGNOSIS — Z01.818 PREOP EXAMINATION: Primary | ICD-10-CM

## 2023-12-05 DIAGNOSIS — E78.00 HYPERCHOLESTEROLEMIA: ICD-10-CM

## 2023-12-05 PROBLEM — G25.0 ESSENTIAL TREMOR: Status: ACTIVE | Noted: 2022-04-26

## 2023-12-05 PROBLEM — D50.9 MICROCYTIC HYPOCHROMIC ANEMIA: Status: ACTIVE | Noted: 2023-12-05

## 2023-12-05 PROBLEM — J30.2 SEASONAL ALLERGIES: Status: ACTIVE | Noted: 2021-10-06

## 2023-12-05 PROBLEM — D64.9 ANEMIA: Status: ACTIVE | Noted: 2023-12-05

## 2023-12-05 PROBLEM — Z77.29 EXPOSURE TO POTENTIALLY HAZARDOUS SUBSTANCE: Status: ACTIVE | Noted: 2023-12-05

## 2023-12-05 PROBLEM — R03.0 PREHYPERTENSION: Status: ACTIVE | Noted: 2023-12-05

## 2023-12-05 PROBLEM — M19.90 ARTHRITIS: Status: ACTIVE | Noted: 2023-12-05

## 2023-12-05 PROBLEM — M19.90 OSTEOARTHRITIS: Status: ACTIVE | Noted: 2023-12-05

## 2023-12-05 PROBLEM — R06.09 OTHER DYSPNEA AND RESPIRATORY ABNORMALITY: Status: ACTIVE | Noted: 2023-12-05

## 2023-12-05 PROBLEM — N52.9 MALE ERECTILE DISORDER: Status: ACTIVE | Noted: 2023-12-05

## 2023-12-05 PROBLEM — R09.89 OTHER DYSPNEA AND RESPIRATORY ABNORMALITY: Status: ACTIVE | Noted: 2023-12-05

## 2023-12-05 PROBLEM — G47.30 SLEEP APNEA, UNSPECIFIED: Status: ACTIVE | Noted: 2023-12-05

## 2023-12-05 LAB
ABO + RH BLD: NORMAL
ALBUMIN SERPL-MCNC: 4.5 G/DL (ref 3.5–5.7)
ALP SERPL-CCNC: 44 IU/L (ref 34–125)
ALT SERPL-CCNC: 13 IU/L (ref 7–52)
ANION GAP SERPL CALC-SCNC: 7 MEQ/L (ref 3–15)
AST SERPL-CCNC: 16 IU/L (ref 13–39)
ATRIAL RATE: 48
BASOPHILS # BLD: 0.04 K/UL (ref 0.01–0.1)
BASOPHILS NFR BLD: 0.5 %
BILIRUB SERPL-MCNC: 0.5 MG/DL (ref 0.3–1.2)
BLD GP AB SCN SERPL QL: NEGATIVE
BLOOD BANK CMNT PATIENT-IMP: NORMAL
BUN SERPL-MCNC: 11 MG/DL (ref 7–25)
CALCIUM SERPL-MCNC: 9.7 MG/DL (ref 8.6–10.3)
CHLORIDE SERPL-SCNC: 103 MEQ/L (ref 98–107)
CO2 SERPL-SCNC: 28 MEQ/L (ref 21–31)
CREAT SERPL-MCNC: 1 MG/DL (ref 0.7–1.3)
D AG BLD QL: POSITIVE
DIFFERENTIAL METHOD BLD: ABNORMAL
EGFRCR SERPLBLD CKD-EPI 2021: >60 ML/MIN/1.73M*2
EOSINOPHIL # BLD: 0.38 K/UL (ref 0.04–0.54)
EOSINOPHIL NFR BLD: 5.1 %
ERYTHROCYTE [DISTWIDTH] IN BLOOD BY AUTOMATED COUNT: 16.4 % (ref 11.6–14.4)
GLUCOSE SERPL-MCNC: 82 MG/DL (ref 70–99)
HCT VFR BLDCO AUTO: 44.1 % (ref 40.1–51)
HGB BLD-MCNC: 14.4 G/DL (ref 13.7–17.5)
IMM GRANULOCYTES # BLD AUTO: 0.03 K/UL (ref 0–0.08)
IMM GRANULOCYTES NFR BLD AUTO: 0.4 %
LABORATORY COMMENT REPORT: NORMAL
LYMPHOCYTES # BLD: 1.46 K/UL (ref 1.2–3.5)
LYMPHOCYTES NFR BLD: 19.5 %
MCH RBC QN AUTO: 28.2 PG (ref 28–33.2)
MCHC RBC AUTO-ENTMCNC: 32.7 G/DL (ref 32.2–36.5)
MCV RBC AUTO: 86.5 FL (ref 83–98)
MONOCYTES # BLD: 0.52 K/UL (ref 0.3–1)
MONOCYTES NFR BLD: 6.9 %
NEUTROPHILS # BLD: 5.07 K/UL (ref 1.7–7)
NEUTS SEG NFR BLD: 67.6 %
NRBC BLD-RTO: 0 %
P AXIS: 14
PDW BLD AUTO: 11.1 FL (ref 9.4–12.4)
PLATELET # BLD AUTO: 209 K/UL (ref 150–350)
POTASSIUM SERPL-SCNC: 4.3 MEQ/L (ref 3.5–5.1)
PR INTERVAL: 174
PROT SERPL-MCNC: 7.1 G/DL (ref 6–8.2)
QRS DURATION: 66
QT INTERVAL: 402
QTC CALCULATION(BAZETT): 359
R AXIS: 34
RBC # BLD AUTO: 5.1 M/UL (ref 4.5–5.8)
SODIUM SERPL-SCNC: 138 MEQ/L (ref 136–145)
SPECIMEN EXP DATE BLD: NORMAL
T WAVE AXIS: 42
VENTRICULAR RATE: 48
WBC # BLD AUTO: 7.5 K/UL (ref 3.8–10.5)

## 2023-12-05 PROCEDURE — 93005 ELECTROCARDIOGRAM TRACING: CPT

## 2023-12-05 PROCEDURE — 80053 COMPREHEN METABOLIC PANEL: CPT

## 2023-12-05 PROCEDURE — 93010 ELECTROCARDIOGRAM REPORT: CPT | Performed by: INTERNAL MEDICINE

## 2023-12-05 PROCEDURE — 36415 COLL VENOUS BLD VENIPUNCTURE: CPT

## 2023-12-05 PROCEDURE — 85025 COMPLETE CBC W/AUTO DIFF WBC: CPT

## 2023-12-05 PROCEDURE — 99204 OFFICE O/P NEW MOD 45 MIN: CPT | Performed by: INTERNAL MEDICINE

## 2023-12-05 PROCEDURE — 86901 BLOOD TYPING SEROLOGIC RH(D): CPT

## 2023-12-05 RX ORDER — PROPRANOLOL HYDROCHLORIDE 60 MG/1
60 CAPSULE, EXTENDED RELEASE ORAL DAILY
COMMUNITY

## 2023-12-05 RX ORDER — SIMVASTATIN 20 MG/1
20 TABLET, FILM COATED ORAL NIGHTLY
COMMUNITY

## 2023-12-05 ASSESSMENT — PAIN SCALES - GENERAL: PAINLEVEL: 0-NO PAIN

## 2023-12-05 NOTE — CONSULTS
[facility] Medicine Service -  Pre-Operative Consultation       Patient Name: Lc Soliz  Referring Surgeon: Dr. Miguel Craig    Reason for Referral: Pre-Operative Evaluation  Surgical Procedure: SP Robotic/Laparoscopic Anterior Resection  Operative Date: 12/14/23  Other Providers:      PCP: Ron Baig MD        HISTORY OF PRESENT ILLNESS      Lc Soliz is a 68 y.o. male presenting today to the [facility] at [facility] for pre-operative evaluation prior to planned surgery.    This patient underwent a colonoscopy on 11/7/23 ([facility]) in setting of mucus drainage from his anus and occasional blood with wiping. He was evaluated by Dr. Craig with plan for surgery as above. He reports he's recently lost a few pounds, stable energy and appetite, no other symptoms.     In regards to medical history:  · He has dyslipidemia on a statin.  · He has hypothyroidism medicated.  · He has seasonal allergies and uses Flonase.   · He has essential tremor and his on propranolol.   · He has NICOLAS on CPAP.  · He has GERD on a PPI.   · He had a bout of pneumonia treated in early Nov with antibiotics as an outpatient, recovered.     The patient denies any current or recent chest pain or pressure, dyspnea, cough, sputum, fevers, chills, abdominal pain, nausea, vomiting, diarrhea or other symptoms.     Functionally, the patient is able to ascend a flight or so of stairs with no dyspnea or chest pain.     The patient denies, on specific questioning, the following:  No history of MI, arrhythmia,or CHF  No history of DVT/PE.  No history of COPD.  No history of CVA.  No history of DM.   No history of CKD.     PAST MEDICAL AND SURGICAL HISTORY      Past Medical History:   Diagnosis Date   • Arthritis    • Disease of thyroid gland    • Diverticulitis of colon    • Dyslipidemia    • Essential tremor    • GERD (gastroesophageal reflux disease)    • Hypothyroidism    • NICOLAS  "on CPAP    • Seasonal allergies        Past Surgical History:   Procedure Laterality Date   • COLONOSCOPY     • KNEE SURGERY Left        MEDICATIONS        Current Outpatient Medications:   •  propranolol LA (INDERAL LA) 60 mg 24 hr capsule, Take 60 mg by mouth daily., Disp: , Rfl:   •  simvastatin (ZOCOR) 20 mg tablet, Take 20 mg by mouth nightly., Disp: , Rfl:   •  benzonatate (TESSALON) 100 mg capsule, take 1 capsule by mouth three times a day if needed for cough, Disp: , Rfl:   •  fluticasone propionate (FLONASE) 50 mcg/actuation nasal spray, Administer 2 sprays into affected nostril(s) daily., Disp: , Rfl:   •  levothyroxine (SYNTHROID) 88 mcg tablet, Take 88 mcg by mouth daily., Disp: , Rfl:   •  omeprazole (PriLOSEC) 20 mg capsule, Take 20 mg by mouth daily before breakfast., Disp: , Rfl:   •  TRICOR 48 mg tablet, Take 48 mg by mouth daily., Disp: , Rfl:     ALLERGIES      Patient has no known allergies.    FAMILY HISTORY      family history includes Diabetes in his biological father; Prostate cancer in his biological brother; Stomach cancer in his maternal grandmother; Stroke in his biological father.    Denies any prior known family history of DVTs/PEs/clotting disorder    SOCIAL HISTORY      Social History     Tobacco Use   • Smoking status: Former     Packs/day: 1.00     Years: 20.00     Additional pack years: 0.00     Total pack years: 20.00     Types: Cigarettes     Quit date:      Years since quittin.9   • Smokeless tobacco: Never   Substance Use Topics   • Alcohol use: Yes     Alcohol/week: 1.0 standard drink of alcohol     Types: 1 Glasses of wine per week   • Drug use: Never       REVIEW OF SYSTEMS      All other systems reviewed and negative except as noted in HPI    PHYSICAL EXAMINATION      Visit Vitals  BP (!) 161/83 (BP Location: Right upper arm, Patient Position: Sitting)   Pulse (!) 56   Temp 36.3 °C (97.4 °F) (Temporal)   Resp 16   Ht 1.778 m (5' 10\")   Wt 97.1 kg (214 lb)   SpO2 " 97%   BMI 30.71 kg/m²     Body mass index is 30.71 kg/m².  GEN: well-developed and well-nourished; not in acute distress  HEENT: normocephalic; atraumatic  NECK: no JVD; no bruits  CARDIO: kristal reg rhythm; no murmurs or rubs  RESP: clear to auscultation bilaterally; no rales, rhonchi, or wheezes  ABD: soft, non-distended, non-tender, normal bowel sounds  EXT: no cyanosis, clubbing, or edema  SKIN: clean, dry, warm, and intact  MUSCULOSKELETAL: no injury or deformity  NEURO: alert and oriented x 3; nonfocal  BEHAVIOR/EMOTIONAL: appropriate; cooperative    LABS / EKG        Labs  Lab Results   Component Value Date     12/05/2023    K 4.3 12/05/2023     12/05/2023    BUN 11 12/05/2023    CREATININE 1.0 12/05/2023    WBC 7.50 12/05/2023    HGB 14.4 12/05/2023    HCT 44.1 12/05/2023     12/05/2023    ALT 13 12/05/2023    AST 16 12/05/2023       ECG/Telemetry  sinus bradycardia    ASSESSMENT AND PLAN         pre-operative evaluation  Medical management and tamar-operative risk commentary noted as per this document's contents.    colon cancer  Surgery as scheduled.    dyslipidemia  Continue statin.    hypothyroidism  Continue levothyroxine.    seasonal allergies  Continue Flonase.    essential tremor  He is on propranolol.    GERD  Continue PPI.    obstructive sleep apnea  Continue CPAP. I would recommend use of our NICOLAS protocol as NICOLAS places the patient at relatively increased risk (compared to a population without this diagnosis) of oxygen desaturation, cardiac events, acute respiratory failure, or an ICU transfer.         In regards to perioperative cardiac risk:  The patient denies any history of ischemic heart disease, denies any history of CHF, denies any history of CVA, is not on pre-operative treatment with insulin, and does not have a pre-operative creatinine > 2 mg/dL.   The Revised Cardiac Risk Index (RCRI) for this patient indicates low/acceptable risk.     Further comments:  Resume  supplements when OK with surgical team.  I would encourage incentive spirometry to assist with minimizing tamar-operative pulmonary risk.  DVT prophylaxis and timing of such per the discretion of the surgeon.     Please do not hesitate to contact [facility] during the upcoming hospitalization with any questions or concerns.     Jona Penny MD  12/6/2023

## 2023-12-05 NOTE — PRE-PROCEDURE INSTRUCTIONS
1. We will call you between 3 pm and 7 pm on December 13, 2023 to determine that arrival time for your procedure. If you do not hear by 6PM. Please call [phone] for arrival time.     2. Please report to Main Entrance near Parking lot A, walk into main lobby and report to the admission desk on the first floor on the day of your procedure.    3. Please follow the following fasting guidelines:     No solid food EIGHT HOURS prior to surgery.  Unlimited clear liquids, meaning water or PLAIN black coffee WITHOUT any milk, cream, sugar, or sweetener are permitted up to TWO HOURS prior to arrival at the hospital.    4. Please take ONLY the following medications with a sip of water on the morning of your procedure:   Tricor, simvastatin, synthroid, prilosec and propranolol AM of surgery  Please hold all vitamins/herbal supplements for 14 days prior to surgery.  Please hold all NSAIDs including ibuprofen, naproxen, aleve, mobic 7 days prior to surgery, you make take tylenol up to and including the day of surgery.      5. Other Instructions: You may brush your teeth the morning of the procedure. Rinse and spit, do not swallow.  Bring a list of your medications with dosages.  Use surgical wash as       directed.     6. If you develop a cold, cough, fever, rash, or any other symptom prior to the day of the procedure, please report it to your physician immediately.    7. If you need to cancel the procedure for any reason, please contact your physician.    8. Make arrangements to have safe transportation home accompanied by a responsible adult. If you have not arranged safe transportation home, your surgery will be cancelled. Safe transportation may include private vehicle, ride-share service, taxi and public transportation when accompanied by a responsible adult who will assist you home. A responsible adult is someone known to you and does not include the taxi, ride-share or public transit drive transporting you.    9. You  may not take public transportation unless accompanied by a responsible person.    10. You may not drive a car or operate complex or potentially dangerous machinery for 24 hours following anesthesia and/or sedation.    11.  If it is medically necessary for you to have a longer stay, you will be informed as soon as the decision is made.    12. Only bring essential items to the hospital.  Do not wear or bring anything of value to the hospital including jewelry of any kind, money, or wallet. Do not wear make-up or contact lenses.  DO NOT BRING MEDICATIONS FROM HOME unless instructed to do so. DO bring your hearing aids, glasses, and a case    13. No lotion, creams, powders, or oils on skin once you start the surgical wash or Dial soap.        14. Dress in comfortable clothes.    15.  If instructed, please bring a copy of your Advanced Directive (Living Will/Durable Power of ) on the day of your procedure.     16. Ensuring your safety at all times is a very important part of our [facility] Culture of Safety. After having surgery and sedation, you are at risk for falling and balance issues. Although you may feel awake, the effects of the medication can last up to 24 hours after anesthesia. If you need to use the bathroom during your recovery period, nursing staff will escort you there and stay with you to ensure your safety.    17. Refrain from drinking alcohol and smoking cigarettes/ marijuana for 24 hours prior to surgery.    18. Shower with antibacterial soap (Dial) the night before and morning of your procedure.  If your procedure indicates the need for CHG antiseptic wash (Bactoshield or Hibiclens), please use this instead and follow instructions as discussed at the time of your Pre-Admission Testing phone interview or visit.    Above instructions reviewed with patient and patient acknowledges understanding.  Form explained by: MARCIA Eldridge     I have read and understand the above information. I have had  sufficient opportunity to ask questions I might have and they have been answered to my satisfaction. I agree to comply with the Patient Responsibilities listed above and have received a copy of this form.        [facility]   Patient Education Preoperative Showers       Good Hygiene, such as frequent handwashing and daily skin cleansing, promotes good health.  Daily skin cleansing helps remove germs that may cause infections. The following instructions should be followed to help reduce germs on your skin prior to your surgical procedure.     · Bactoshield/Hibiclens CHG 4% is an antiseptic soap.  The active ingredient is chlorhexidine gluconate. Do not use this product if you are allergic to chlorhexidine gluconate.     · The NIGHT before and the MORNING of your procedure , shower or bathe with Bactoshield. This product should replace your regular soap used for cleansing most of your body surfaces. Bactoshield should not be used on your head or face: keep out of your eyes, ears, and mouth.      · If you plan to wash your hair, do so with your regular shampoo. Then, rinse the hair and your body thoroughly to move any shampoo residue.     · Wash face with regular soap and water only.     · Wash your genital area with soap and water only.    · Thoroughly rinse your body with warm water from the neck down.       · Apply the minimum amount of Bactoshield to cover the skin. Use this product as you would any liquid soap. Leave this on for 2 minutes. Note- Bactoshield DOES NOT LATHER like normal soap.      · Wash the skin gently and rinse thoroughly with warm water. You do not need to scrub the skin to remove germs.      · Do not use regular soap after you have applied and rinsed the Bactoshield.  Change into clean clothes after each shower.     · Do not apply any lotion, powder, or perfumes to the body areas that have been cleansed with Bactoshield.     · No use of hair removal products or shaving at or near the  surgical site 48 hours before your procedure. (72 hours for cardiac patients.)    · For those having perineal area surgeries (ie: vaginal, rectal or cystoscopy) - please use Dial soap.        Why do we cleanse the skin prior to surgery?   There are lots of germs on our skin and in our environment. Most are harmless, some are even helpful on our skin and in our environment. As part of your preparation you will be asked to purchase a bottle of antibacterial soap, Bactoshield CHG 4% or Hibiclens CHG4% to cleanse your skin and eliminate a certain bacteria called, Staphylococcus Aureus.      What is Staphylococcus aureus? (Staph)   Staph is a common germ found on the skin. One-third of healthy people carry this germ. Methicillin-resistant Staphylococcus aureus (MRSA) is a type of Staph bacteria that is resistant to certain antibiotics. In the community, most MRSA infections are skin infections.  People who have Staph/MRSA may show no signs of illness- this is called colonization. Caution needs to be used when you come into the hospital for a surgical procedure to ensure that Staph does not enter your body.     Am I at increased risk for infection if I am carrying Staph ?   Because Staph is carried on your skin, you may be at increased risk for developing a surgical infection. To reduce the presence of Staph on your skin, we recommend a series of preoperative showers with an antibacterial skin cleanser. These showers are to be done the NIGHT BEFORE your surgery and the MORNING of your surgery.

## 2023-12-13 ENCOUNTER — ANESTHESIA EVENT (OUTPATIENT)
Dept: OPERATING ROOM | Facility: HOSPITAL | Age: 68
Setting detail: SURGERY ADMIT
DRG: 331 | End: 2023-12-13
Payer: MEDICARE

## 2023-12-14 ENCOUNTER — HOSPITAL ENCOUNTER (INPATIENT)
Facility: HOSPITAL | Age: 68
LOS: 2 days | Discharge: HOME | DRG: 331 | End: 2023-12-16
Attending: COLON & RECTAL SURGERY | Admitting: COLON & RECTAL SURGERY
Payer: MEDICARE

## 2023-12-14 ENCOUNTER — ANESTHESIA (OUTPATIENT)
Dept: OPERATING ROOM | Facility: HOSPITAL | Age: 68
Setting detail: SURGERY ADMIT
DRG: 331 | End: 2023-12-14
Payer: MEDICARE

## 2023-12-14 DIAGNOSIS — C18.7 MALIGNANT NEOPLASM OF SIGMOID COLON (CMS/HCC): ICD-10-CM

## 2023-12-14 LAB
ABO + RH BLD: NORMAL
D AG BLD QL: POSITIVE
LABORATORY COMMENT REPORT: NORMAL

## 2023-12-14 PROCEDURE — 63600000 HC DRUGS/DETAIL CODE: Mod: JZ

## 2023-12-14 PROCEDURE — 25800000 HC PHARMACY IV SOLUTIONS: Performed by: NURSE ANESTHETIST, CERTIFIED REGISTERED

## 2023-12-14 PROCEDURE — 71000001 HC PACU PHASE 1 INITIAL 30MIN: Performed by: COLON & RECTAL SURGERY

## 2023-12-14 PROCEDURE — 36000016 HC OR LEVEL 6 EA ADDL MIN: Performed by: COLON & RECTAL SURGERY

## 2023-12-14 PROCEDURE — 0DTP4ZZ RESECTION OF RECTUM, PERCUTANEOUS ENDOSCOPIC APPROACH: ICD-10-PCS | Performed by: COLON & RECTAL SURGERY

## 2023-12-14 PROCEDURE — 88309 TISSUE EXAM BY PATHOLOGIST: CPT | Performed by: COLON & RECTAL SURGERY

## 2023-12-14 PROCEDURE — 63700000 HC SELF-ADMINISTRABLE DRUG

## 2023-12-14 PROCEDURE — 63600000 HC DRUGS/DETAIL CODE: Mod: JZ | Performed by: NURSE ANESTHETIST, CERTIFIED REGISTERED

## 2023-12-14 PROCEDURE — 21400000 HC ROOM AND CARE CCU/INTERMEDIATE

## 2023-12-14 PROCEDURE — 36415 COLL VENOUS BLD VENIPUNCTURE: CPT | Performed by: COLON & RECTAL SURGERY

## 2023-12-14 PROCEDURE — 8E0W4CZ ROBOTIC ASSISTED PROCEDURE OF TRUNK REGION, PERCUTANEOUS ENDOSCOPIC APPROACH: ICD-10-PCS | Performed by: COLON & RECTAL SURGERY

## 2023-12-14 PROCEDURE — 25000000 HC PHARMACY GENERAL

## 2023-12-14 PROCEDURE — 63600000 HC DRUGS/DETAIL CODE: Performed by: SURGERY

## 2023-12-14 PROCEDURE — 44207 L COLECTOMY/COLOPROCTOSTOMY: CPT | Performed by: COLON & RECTAL SURGERY

## 2023-12-14 PROCEDURE — 63600000 HC DRUGS/DETAIL CODE: Mod: JZ | Performed by: SURGERY

## 2023-12-14 PROCEDURE — 36000006 HC OR LEVEL 6 INITIAL 30MIN: Performed by: COLON & RECTAL SURGERY

## 2023-12-14 PROCEDURE — 37000001 HC ANESTHESIA GENERAL: Performed by: COLON & RECTAL SURGERY

## 2023-12-14 PROCEDURE — 25000000 HC PHARMACY GENERAL: Performed by: NURSE ANESTHETIST, CERTIFIED REGISTERED

## 2023-12-14 PROCEDURE — 44213 LAP MOBIL SPLENIC FL ADD-ON: CPT | Performed by: COLON & RECTAL SURGERY

## 2023-12-14 PROCEDURE — 71000011 HC PACU PHASE 1 EA ADDL MIN: Performed by: COLON & RECTAL SURGERY

## 2023-12-14 PROCEDURE — 27200000 HC STERILE SUPPLY: Performed by: COLON & RECTAL SURGERY

## 2023-12-14 PROCEDURE — 0DTQ4ZZ RESECTION OF ANUS, PERCUTANEOUS ENDOSCOPIC APPROACH: ICD-10-PCS | Performed by: COLON & RECTAL SURGERY

## 2023-12-14 PROCEDURE — 0DTN4ZZ RESECTION OF SIGMOID COLON, PERCUTANEOUS ENDOSCOPIC APPROACH: ICD-10-PCS | Performed by: COLON & RECTAL SURGERY

## 2023-12-14 RX ORDER — LIDOCAINE HCL/PF 100 MG/5ML
SYRINGE (ML) INTRAVENOUS AS NEEDED
Status: DISCONTINUED | OUTPATIENT
Start: 2023-12-14 | End: 2023-12-14 | Stop reason: SURG

## 2023-12-14 RX ORDER — LEVOTHYROXINE SODIUM 88 UG/1
88 TABLET ORAL
Status: DISCONTINUED | OUTPATIENT
Start: 2023-12-15 | End: 2023-12-16 | Stop reason: HOSPADM

## 2023-12-14 RX ORDER — PROPOFOL 10 MG/ML
INJECTION, EMULSION INTRAVENOUS AS NEEDED
Status: DISCONTINUED | OUTPATIENT
Start: 2023-12-14 | End: 2023-12-14 | Stop reason: SURG

## 2023-12-14 RX ORDER — MIDAZOLAM HYDROCHLORIDE 2 MG/2ML
INJECTION, SOLUTION INTRAMUSCULAR; INTRAVENOUS AS NEEDED
Status: DISCONTINUED | OUTPATIENT
Start: 2023-12-14 | End: 2023-12-14 | Stop reason: SURG

## 2023-12-14 RX ORDER — MEPERIDINE HYDROCHLORIDE 50 MG/ML
50 INJECTION INTRAMUSCULAR; INTRAVENOUS; SUBCUTANEOUS EVERY 4 HOURS PRN
Status: DISCONTINUED | OUTPATIENT
Start: 2023-12-14 | End: 2023-12-16 | Stop reason: HOSPADM

## 2023-12-14 RX ORDER — DEXTROSE 40 %
15-30 GEL (GRAM) ORAL AS NEEDED
Status: DISCONTINUED | OUTPATIENT
Start: 2023-12-14 | End: 2023-12-16 | Stop reason: HOSPADM

## 2023-12-14 RX ORDER — ALVIMOPAN 12 MG/1
12 CAPSULE ORAL 2 TIMES DAILY
Status: DISCONTINUED | OUTPATIENT
Start: 2023-12-15 | End: 2023-12-16 | Stop reason: HOSPADM

## 2023-12-14 RX ORDER — PROPRANOLOL HYDROCHLORIDE 60 MG/1
60 CAPSULE, EXTENDED RELEASE ORAL DAILY
Status: DISCONTINUED | OUTPATIENT
Start: 2023-12-14 | End: 2023-12-16 | Stop reason: HOSPADM

## 2023-12-14 RX ORDER — DEXTROSE 50 % IN WATER (D50W) INTRAVENOUS SYRINGE
25 AS NEEDED
Status: DISCONTINUED | OUTPATIENT
Start: 2023-12-14 | End: 2023-12-16 | Stop reason: HOSPADM

## 2023-12-14 RX ORDER — IBUPROFEN 600 MG/1
600 TABLET ORAL
Status: DISCONTINUED | OUTPATIENT
Start: 2023-12-16 | End: 2023-12-14

## 2023-12-14 RX ORDER — ONDANSETRON HYDROCHLORIDE 2 MG/ML
4 INJECTION, SOLUTION INTRAVENOUS
Status: DISCONTINUED | OUTPATIENT
Start: 2023-12-14 | End: 2023-12-14 | Stop reason: HOSPADM

## 2023-12-14 RX ORDER — HYDROMORPHONE HYDROCHLORIDE 1 MG/ML
INJECTION, SOLUTION INTRAMUSCULAR; INTRAVENOUS; SUBCUTANEOUS AS NEEDED
Status: DISCONTINUED | OUTPATIENT
Start: 2023-12-14 | End: 2023-12-14 | Stop reason: SURG

## 2023-12-14 RX ORDER — KETOROLAC TROMETHAMINE 15 MG/ML
10 INJECTION, SOLUTION INTRAMUSCULAR; INTRAVENOUS
Status: DISCONTINUED | OUTPATIENT
Start: 2023-12-14 | End: 2023-12-16 | Stop reason: HOSPADM

## 2023-12-14 RX ORDER — FENTANYL CITRATE 50 UG/ML
50 INJECTION, SOLUTION INTRAMUSCULAR; INTRAVENOUS EVERY 5 MIN PRN
Status: DISCONTINUED | OUTPATIENT
Start: 2023-12-14 | End: 2023-12-14 | Stop reason: HOSPADM

## 2023-12-14 RX ORDER — PHENYLEPHRINE HCL IN 0.9% NACL 1 MG/10 ML
SYRINGE (ML) INTRAVENOUS AS NEEDED
Status: DISCONTINUED | OUTPATIENT
Start: 2023-12-14 | End: 2023-12-14 | Stop reason: SURG

## 2023-12-14 RX ORDER — CELECOXIB 200 MG/1
200 CAPSULE ORAL ONCE
Status: COMPLETED | OUTPATIENT
Start: 2023-12-14 | End: 2023-12-14

## 2023-12-14 RX ORDER — ACETAMINOPHEN 325 MG/1
975 TABLET ORAL
Status: DISCONTINUED | OUTPATIENT
Start: 2023-12-14 | End: 2023-12-16 | Stop reason: HOSPADM

## 2023-12-14 RX ORDER — GABAPENTIN 300 MG/1
600 CAPSULE ORAL ONCE
Status: COMPLETED | OUTPATIENT
Start: 2023-12-14 | End: 2023-12-14

## 2023-12-14 RX ORDER — LABETALOL HCL 20 MG/4 ML
5 SYRINGE (ML) INTRAVENOUS AS NEEDED
Status: DISCONTINUED | OUTPATIENT
Start: 2023-12-14 | End: 2023-12-14 | Stop reason: HOSPADM

## 2023-12-14 RX ORDER — METRONIDAZOLE 500 MG/100ML
500 INJECTION, SOLUTION INTRAVENOUS
Status: COMPLETED | OUTPATIENT
Start: 2023-12-14 | End: 2023-12-14

## 2023-12-14 RX ORDER — METRONIDAZOLE 500 MG/100ML
500 INJECTION, SOLUTION INTRAVENOUS
Status: DISCONTINUED | OUTPATIENT
Start: 2023-12-14 | End: 2023-12-14

## 2023-12-14 RX ORDER — IBUPROFEN 200 MG
16-32 TABLET ORAL AS NEEDED
Status: DISCONTINUED | OUTPATIENT
Start: 2023-12-14 | End: 2023-12-14 | Stop reason: HOSPADM

## 2023-12-14 RX ORDER — KETAMINE HYDROCHLORIDE 10 MG/ML
INJECTION, SOLUTION INTRAMUSCULAR; INTRAVENOUS AS NEEDED
Status: DISCONTINUED | OUTPATIENT
Start: 2023-12-14 | End: 2023-12-14 | Stop reason: SURG

## 2023-12-14 RX ORDER — HYDRALAZINE HYDROCHLORIDE 20 MG/ML
5 INJECTION INTRAMUSCULAR; INTRAVENOUS
Status: DISCONTINUED | OUTPATIENT
Start: 2023-12-14 | End: 2023-12-14 | Stop reason: HOSPADM

## 2023-12-14 RX ORDER — IBUPROFEN 200 MG
16-32 TABLET ORAL AS NEEDED
Status: DISCONTINUED | OUTPATIENT
Start: 2023-12-14 | End: 2023-12-16 | Stop reason: HOSPADM

## 2023-12-14 RX ORDER — ACETAMINOPHEN 325 MG/1
975 TABLET ORAL ONCE
Status: COMPLETED | OUTPATIENT
Start: 2023-12-14 | End: 2023-12-14

## 2023-12-14 RX ORDER — DEXAMETHASONE SODIUM PHOSPHATE 4 MG/ML
INJECTION, SOLUTION INTRA-ARTICULAR; INTRALESIONAL; INTRAMUSCULAR; INTRAVENOUS; SOFT TISSUE AS NEEDED
Status: DISCONTINUED | OUTPATIENT
Start: 2023-12-14 | End: 2023-12-14 | Stop reason: SURG

## 2023-12-14 RX ORDER — GABAPENTIN 100 MG/1
200 CAPSULE ORAL 3 TIMES DAILY
Status: DISCONTINUED | OUTPATIENT
Start: 2023-12-14 | End: 2023-12-16 | Stop reason: HOSPADM

## 2023-12-14 RX ORDER — METRONIDAZOLE 500 MG/100ML
500 INJECTION, SOLUTION INTRAVENOUS
Status: DISCONTINUED | OUTPATIENT
Start: 2023-12-15 | End: 2023-12-14

## 2023-12-14 RX ORDER — HYDROMORPHONE HYDROCHLORIDE 1 MG/ML
0.5 INJECTION, SOLUTION INTRAMUSCULAR; INTRAVENOUS; SUBCUTANEOUS
Status: DISCONTINUED | OUTPATIENT
Start: 2023-12-14 | End: 2023-12-14 | Stop reason: HOSPADM

## 2023-12-14 RX ORDER — SODIUM CHLORIDE, SODIUM LACTATE, POTASSIUM CHLORIDE, CALCIUM CHLORIDE 600; 310; 30; 20 MG/100ML; MG/100ML; MG/100ML; MG/100ML
INJECTION, SOLUTION INTRAVENOUS CONTINUOUS
Status: DISCONTINUED | OUTPATIENT
Start: 2023-12-14 | End: 2023-12-15

## 2023-12-14 RX ORDER — LIDOCAINE HYDROCHLORIDE ANHYDROUS AND DEXTROSE MONOHYDRATE .8; 5 G/100ML; G/100ML
INJECTION, SOLUTION INTRAVENOUS CONTINUOUS PRN
Status: DISCONTINUED | OUTPATIENT
Start: 2023-12-14 | End: 2023-12-14 | Stop reason: SURG

## 2023-12-14 RX ORDER — MEPERIDINE HYDROCHLORIDE 25 MG/ML
25 INJECTION INTRAMUSCULAR; INTRAVENOUS; SUBCUTANEOUS EVERY 4 HOURS PRN
Status: DISCONTINUED | OUTPATIENT
Start: 2023-12-14 | End: 2023-12-14

## 2023-12-14 RX ORDER — DEXTROSE 40 %
15-30 GEL (GRAM) ORAL AS NEEDED
Status: DISCONTINUED | OUTPATIENT
Start: 2023-12-14 | End: 2023-12-14 | Stop reason: HOSPADM

## 2023-12-14 RX ORDER — EPHEDRINE SULFATE 50 MG/ML
INJECTION, SOLUTION INTRAVENOUS AS NEEDED
Status: DISCONTINUED | OUTPATIENT
Start: 2023-12-14 | End: 2023-12-14

## 2023-12-14 RX ORDER — EPHEDRINE SULFATE/0.9% NACL/PF 50 MG/5 ML
SYRINGE (ML) INTRAVENOUS AS NEEDED
Status: DISCONTINUED | OUTPATIENT
Start: 2023-12-14 | End: 2023-12-14 | Stop reason: SURG

## 2023-12-14 RX ORDER — DEXTROSE 50 % IN WATER (D50W) INTRAVENOUS SYRINGE
25 AS NEEDED
Status: DISCONTINUED | OUTPATIENT
Start: 2023-12-14 | End: 2023-12-14 | Stop reason: HOSPADM

## 2023-12-14 RX ORDER — METRONIDAZOLE 500 MG/100ML
500 INJECTION, SOLUTION INTRAVENOUS
Status: DISCONTINUED | OUTPATIENT
Start: 2023-12-15 | End: 2023-12-16 | Stop reason: HOSPADM

## 2023-12-14 RX ORDER — PANTOPRAZOLE SODIUM 40 MG/10ML
40 INJECTION, POWDER, LYOPHILIZED, FOR SOLUTION INTRAVENOUS EVERY 24 HOURS
Status: DISCONTINUED | OUTPATIENT
Start: 2023-12-14 | End: 2023-12-16 | Stop reason: HOSPADM

## 2023-12-14 RX ORDER — SODIUM CHLORIDE 9 MG/ML
INJECTION, SOLUTION INTRAVENOUS CONTINUOUS PRN
Status: DISCONTINUED | OUTPATIENT
Start: 2023-12-14 | End: 2023-12-14 | Stop reason: SURG

## 2023-12-14 RX ORDER — FENTANYL CITRATE 50 UG/ML
INJECTION, SOLUTION INTRAMUSCULAR; INTRAVENOUS AS NEEDED
Status: DISCONTINUED | OUTPATIENT
Start: 2023-12-14 | End: 2023-12-14 | Stop reason: SURG

## 2023-12-14 RX ORDER — DIPHENHYDRAMINE HCL 50 MG/ML
25 VIAL (ML) INJECTION EVERY 4 HOURS PRN
Status: DISCONTINUED | OUTPATIENT
Start: 2023-12-14 | End: 2023-12-16 | Stop reason: HOSPADM

## 2023-12-14 RX ORDER — CEFAZOLIN SODIUM 2 G/100ML
2 INJECTION, SOLUTION INTRAVENOUS
Status: COMPLETED | OUTPATIENT
Start: 2023-12-14 | End: 2023-12-14

## 2023-12-14 RX ORDER — ONDANSETRON HYDROCHLORIDE 2 MG/ML
INJECTION, SOLUTION INTRAVENOUS AS NEEDED
Status: DISCONTINUED | OUTPATIENT
Start: 2023-12-14 | End: 2023-12-14 | Stop reason: SURG

## 2023-12-14 RX ORDER — ROCURONIUM BROMIDE 10 MG/ML
INJECTION, SOLUTION INTRAVENOUS AS NEEDED
Status: DISCONTINUED | OUTPATIENT
Start: 2023-12-14 | End: 2023-12-14 | Stop reason: SURG

## 2023-12-14 RX ORDER — ALVIMOPAN 12 MG/1
12 CAPSULE ORAL ONCE
Status: COMPLETED | OUTPATIENT
Start: 2023-12-14 | End: 2023-12-14

## 2023-12-14 RX ORDER — SODIUM CHLORIDE, SODIUM GLUCONATE, SODIUM ACETATE, POTASSIUM CHLORIDE AND MAGNESIUM CHLORIDE 30; 37; 368; 526; 502 MG/100ML; MG/100ML; MG/100ML; MG/100ML; MG/100ML
INJECTION, SOLUTION INTRAVENOUS CONTINUOUS PRN
Status: DISCONTINUED | OUTPATIENT
Start: 2023-12-14 | End: 2023-12-14 | Stop reason: SURG

## 2023-12-14 RX ORDER — INDOCYANINE GREEN AND WATER 25 MG
KIT INJECTION AS NEEDED
Status: DISCONTINUED | OUTPATIENT
Start: 2023-12-14 | End: 2023-12-14 | Stop reason: SURG

## 2023-12-14 RX ADMIN — CEFAZOLIN SODIUM 2 G: 2 INJECTION, SOLUTION INTRAVENOUS at 11:30

## 2023-12-14 RX ADMIN — ACETAMINOPHEN 975 MG: 325 TABLET ORAL at 16:32

## 2023-12-14 RX ADMIN — Medication 10 MG: at 07:48

## 2023-12-14 RX ADMIN — CEFAZOLIN SODIUM 2 G: 2 INJECTION, SOLUTION INTRAVENOUS at 07:30

## 2023-12-14 RX ADMIN — LIDOCAINE HYDROCHLORIDE ANHYDROUS AND DEXTROSE MONOHYDRATE 1 MG/MIN: .8; 5 INJECTION, SOLUTION INTRAVENOUS at 07:17

## 2023-12-14 RX ADMIN — Medication 100 MCG: at 07:37

## 2023-12-14 RX ADMIN — KETAMINE HYDROCHLORIDE 50 MG: 10 INJECTION INTRAMUSCULAR; INTRAVENOUS at 07:17

## 2023-12-14 RX ADMIN — ROCURONIUM BROMIDE 100 MG: 10 INJECTION INTRAVENOUS at 07:08

## 2023-12-14 RX ADMIN — KETAMINE HYDROCHLORIDE 30 MG: 10 INJECTION INTRAMUSCULAR; INTRAVENOUS at 08:19

## 2023-12-14 RX ADMIN — DEXAMETHASONE SODIUM PHOSPHATE 8 MG: 4 INJECTION, SOLUTION INTRAMUSCULAR; INTRAVENOUS at 07:17

## 2023-12-14 RX ADMIN — FENTANYL CITRATE 50 MCG: 50 INJECTION, SOLUTION INTRAMUSCULAR; INTRAVENOUS at 08:13

## 2023-12-14 RX ADMIN — ONDANSETRON HYDROCHLORIDE 4 MG: 2 SOLUTION INTRAMUSCULAR; INTRAVENOUS at 07:17

## 2023-12-14 RX ADMIN — Medication 100 MCG: at 07:25

## 2023-12-14 RX ADMIN — SODIUM CHLORIDE: 9 INJECTION, SOLUTION INTRAVENOUS at 06:59

## 2023-12-14 RX ADMIN — INDOCYANINE GREEN AND WATER 3 ML: KIT at 13:03

## 2023-12-14 RX ADMIN — Medication 100 MCG: at 07:46

## 2023-12-14 RX ADMIN — Medication 100 MCG: at 07:08

## 2023-12-14 RX ADMIN — FENTANYL CITRATE 50 MCG: 50 INJECTION, SOLUTION INTRAMUSCULAR; INTRAVENOUS at 08:19

## 2023-12-14 RX ADMIN — ROCURONIUM BROMIDE 50 MG: 10 INJECTION INTRAVENOUS at 11:26

## 2023-12-14 RX ADMIN — ALVIMOPAN 12 MG: 12 CAPSULE ORAL at 06:16

## 2023-12-14 RX ADMIN — MIDAZOLAM HYDROCHLORIDE 2 MG: 1 INJECTION, SOLUTION INTRAMUSCULAR; INTRAVENOUS at 06:59

## 2023-12-14 RX ADMIN — LIDOCAINE HYDROCHLORIDE 100 MG: 20 INJECTION, SOLUTION INTRAVENOUS at 07:07

## 2023-12-14 RX ADMIN — METRONIDAZOLE 500 MG: 500 INJECTION, SOLUTION INTRAVENOUS at 07:40

## 2023-12-14 RX ADMIN — KETAMINE HYDROCHLORIDE 20 MG: 10 INJECTION INTRAMUSCULAR; INTRAVENOUS at 12:55

## 2023-12-14 RX ADMIN — Medication 10 MG: at 07:58

## 2023-12-14 RX ADMIN — KETAMINE HYDROCHLORIDE 20 MG: 10 INJECTION INTRAMUSCULAR; INTRAVENOUS at 08:09

## 2023-12-14 RX ADMIN — SUGAMMADEX 200 MG: 100 INJECTION, SOLUTION INTRAVENOUS at 13:32

## 2023-12-14 RX ADMIN — ROCURONIUM BROMIDE 50 MG: 10 INJECTION INTRAVENOUS at 08:19

## 2023-12-14 RX ADMIN — Medication 100 MCG: at 07:20

## 2023-12-14 RX ADMIN — FENTANYL CITRATE 100 MCG: 50 INJECTION, SOLUTION INTRAMUSCULAR; INTRAVENOUS at 07:07

## 2023-12-14 RX ADMIN — CELECOXIB 200 MG: 200 CAPSULE ORAL at 06:16

## 2023-12-14 RX ADMIN — GABAPENTIN 600 MG: 300 CAPSULE ORAL at 06:16

## 2023-12-14 RX ADMIN — KETOROLAC TROMETHAMINE 10 MG: 15 INJECTION, SOLUTION INTRAMUSCULAR; INTRAVENOUS at 21:07

## 2023-12-14 RX ADMIN — GABAPENTIN 200 MG: 100 CAPSULE ORAL at 21:07

## 2023-12-14 RX ADMIN — SODIUM CHLORIDE, POTASSIUM CHLORIDE, SODIUM LACTATE AND CALCIUM CHLORIDE: 600; 310; 30; 20 INJECTION, SOLUTION INTRAVENOUS at 16:04

## 2023-12-14 RX ADMIN — SODIUM CHLORIDE, SODIUM GLUCONATE, SODIUM ACETATE, POTASSIUM CHLORIDE AND MAGNESIUM CHLORIDE: 526; 502; 368; 37; 30 INJECTION, SOLUTION INTRAVENOUS at 07:17

## 2023-12-14 RX ADMIN — SODIUM CHLORIDE, POTASSIUM CHLORIDE, SODIUM LACTATE AND CALCIUM CHLORIDE: 600; 310; 30; 20 INJECTION, SOLUTION INTRAVENOUS at 21:07

## 2023-12-14 RX ADMIN — PANTOPRAZOLE SODIUM 40 MG: 40 INJECTION, POWDER, LYOPHILIZED, FOR SOLUTION INTRAVENOUS at 16:32

## 2023-12-14 RX ADMIN — ACETAMINOPHEN 975 MG: 325 TABLET ORAL at 21:07

## 2023-12-14 RX ADMIN — KETAMINE HYDROCHLORIDE 20 MG: 10 INJECTION INTRAMUSCULAR; INTRAVENOUS at 10:00

## 2023-12-14 RX ADMIN — ACETAMINOPHEN 975 MG: 325 TABLET ORAL at 06:16

## 2023-12-14 RX ADMIN — KETOROLAC TROMETHAMINE 10 MG: 15 INJECTION, SOLUTION INTRAMUSCULAR; INTRAVENOUS at 14:53

## 2023-12-14 RX ADMIN — GABAPENTIN 200 MG: 100 CAPSULE ORAL at 16:32

## 2023-12-14 RX ADMIN — HYDROMORPHONE HYDROCHLORIDE 0.5 MG: 1 INJECTION, SOLUTION INTRAMUSCULAR; INTRAVENOUS; SUBCUTANEOUS at 08:10

## 2023-12-14 RX ADMIN — PROPOFOL 200 MG: 10 INJECTION, EMULSION INTRAVENOUS at 07:07

## 2023-12-14 RX ADMIN — KETAMINE HYDROCHLORIDE 20 MG: 10 INJECTION INTRAMUSCULAR; INTRAVENOUS at 11:26

## 2023-12-14 ASSESSMENT — ENCOUNTER SYMPTOMS: SHORTNESS OF BREATH: 1

## 2023-12-14 ASSESSMENT — COGNITIVE AND FUNCTIONAL STATUS - GENERAL
MOVING TO AND FROM BED TO CHAIR: 3 - A LITTLE
CLIMB 3 TO 5 STEPS WITH RAILING: 3 - A LITTLE
WALKING IN HOSPITAL ROOM: 3 - A LITTLE
STANDING UP FROM CHAIR USING ARMS: 3 - A LITTLE

## 2023-12-14 NOTE — OR SURGEON
Pre-Procedure patient identification:  I am the primary operating surgeon/proceduralist and I have reviewed the applicable pathology reports and radiology studies for this procedure. I have identified the patient on 12/14/23 at 6:04 AM Miguel Craig MD  Phone Number: [phone]

## 2023-12-14 NOTE — POST-OP (NON-BILLABLE)
Colorectal Surgery Post-Op Check      Interval History:  Lc Soliz is a 68 y.o. male S/p Procedure(s):  XI Robotic Anterior Resection on 12/14/23 . Patient tolerated the procedure well and was transferred to the PACU in stable condition.    Patient was seen and examined at bedside on 1 pavilion. He was resting comfortably on 3 L oxygen with wife at bedside. Reported that his pain is well controlled on current pain regimen. States that he has just had sips of water so far but no nausea or vomiting. Encouraged to go slow with liquid diet.    Vitals:    12/14/23 1600   BP: (!) 146/84   Pulse: 80   Resp: 16   Temp: 36.6 °C (97.9 °F)   SpO2: 96%       Physical Exam:  General: awake and alert, well appearing, no acute distress. 3 L Nasal cannula   HEENT: normocephalic, atraumatic, EOM intact, conjunctiva clear, sclera nonicteric  CV: normal rate, normotensive  Pulm: normal work of breathing, no acute distress  Abd: soft, appropriately tender to palpation, non-distended. Incisions covered with steri-strips, no strikethrough   Skin: incisions C/D/I, no surrounding erythema  Extr: no obvious abnormality, moving extremities spontaneously  Neuro: Grossly normal    CBC Results       12/05/23     1500    WBC 7.50    RBC 5.10    HGB 14.4    HCT 44.1    MCV 86.5    MCH 28.2    MCHC 32.7              BMP Results       12/05/23     1500        K 4.3    Cl 103    CO2 28    Glucose 82    BUN 11    Creatinine 1.0    Calcium 9.7    Anion Gap 7    EGFR >60.0         Comment for EGFR at 1500 on 12/05/23: Calculation based on the Chronic Kidney Disease Epidemiology Collaboration (CKD-EPI) equation refit without adjustment for race.          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

## 2023-12-14 NOTE — OP NOTE
Colon Cancer   Synoptic Op Report      1. Clinical stage (TNM) T2 N0 M0    2. Tumor Location: sigmoid colon    3. Gross Proximal Margin (cm): 20 cm  4. Gross Distal Margin (cm): 8 cm    5. Extent of lymphovascular resection for cancer in the following location:   A:  Cecum/ Ascending Colon: N/A   B: Hepatic Flexure: N/A   C: Transverse Colon: N/A   D: Splenic Flexure: N/A   E: Descending Colon: N/A    F: Sigmoid Colon: Inferior Mesenteric Artery and Vein     6. Resection Grade:  R0     7. Metastatic Disease: No    8. Site of Metastasis: N/A           9. Intraop findings: No evidence of metastatic disease; appears like active diverticulitis in the sigmoid colon proximal to the lesion.  The tattoo was distal to the lesion about 5 cm resection made distal to that and the mid rectum.    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    Postoperative Diagnosis: Same Notes of metastatic disease    Procedure: XI robotic low anterior section with takedown of splenic flexure descending colorectal anastomosis vascular check with ICG    Surgeon: Miguel Craig MD    Assistant: Kavin Hernández MD, Norris Campo MD and Jt Vick MD    Anesthesia: General endotracheal    Fluids:     Estimated blood loss: 50 cc    Drains: None    Specimens: Portion of rectum sigmoid colon and portion of descending colon    Indications for surgery: 68-year-old gentleman was found to have a adenocarcinoma the distal sigmoid colon on colonoscopy comes now for resection   The operation was discussed at length with the patient.  Indications and other operative and other nonoperative and operative measures were discussed at length.  Risks of procedure were discussed including bleeding infection injury to other organs bladder dysfunction sexual dysfunction lack of healing the need for temporary colostomy bleeding need a permanent colostomy and even death.  Patient understood and wished to proceed with surgery.  Patient understood  their increased risk for surgery due to his history of diverticulitis.    Procedure: After  informed consent was obtained patient was brought to the operating room, timeout was administered, incision made.  Veress needle was placed supraumbilically a a millimeter port was placed under direct vision he millimeter ports placed in the left upper quadrant and right upper quadrant the right lower quadrant a 12 mm port placed in the right lower quadrant.  A 5 mm port was placed subxiphoid with these in place patient was explored no sign of any metastatic disease the patient was put right-side-down 18 degrees 2 degrees reverse Trendelenburg the right axillary robot was brought in and docked using arms 1 and 3 with the camera to the splenic flexure was fully released the gastrocolic ligament was incised with the vessel sealer this was taken off throughout.  The patient had a very large amount of intra-abdominal fat.  The upper line of Toldt was taken and the colon was fully mobilized patient with Trendelenburg right-side-down small bowel was positioned out of the way the x-ray robot was brought back in and redirect and then we dissected out the JAMI the IV robotically the left ureter was identified and was transected with the LigaSure and Endo tie was placed on this because of his vascular issues with hypertension.  The IMV was taken with the LigaSure the lateral attachments were fully mobilized mesentery step to support a call junction where stitch been placed a partial total mesorectal excision was carried out in the upper Arai rectum dissected anterior to the hypogastric nerves down into the mid rectum several centimeters distal to the tattoo the rectum was irrigated the mesentery was taken with the fascia rectum is irrigated and then transected with 2 firings of the robotic green load MISAEL stapler air was desufflated the colon was brought out through most with incision the right lower quadrant at the 12 port site and with  these the bowel was exteriorized the colon was transected a pursestring with 2-0 PDS was placed EEA 20 8 AM but was placed this was done making sure no diverticula in the anastomosis there is good vascularity both which were the case.  Was dropped back abdominal cavity gowns were changed as air was reinsufflated the stapler was placed upper rectum using laparoscope the stapler was open mated with the anvil under direct vision make sure that the bowel was properly oriented was not twisted the stapler was closed and fired donuts were intact x 2 cm was placed in the pelvis there was insufflated with no abnormality ICG was done and there is good vascularity in the proximal distal portions of the colon and rectum there is no small bowel hernia noted mesentery abdomen is irrigated aspirated noted be hemostatic air was desufflated the robot so the laparoscope was removed the ports removed fascia was closed with a running 1 PDS in 2 layers.  In between the skin and all the port sites were closed with 4 Monocryl      I was present for all key and critical portions of the dissection, resection, and anastomosis.  In short all of the key and critical portions of the operation.      Sponge needles and instrument counts were correct ×2.  Patient was taken to recovery room in stable condition

## 2023-12-14 NOTE — BRIEF OP NOTE
XI Robotic Anterior Resection Procedure Note    Procedure:    XI Robotic Anterior Resection  CPT(R) Code:  04551 - MT LAP,SURG,COLECTOMY,W/ANAST,W/COLOSTOMY      Pre-op Diagnosis     * Malignant neoplasm of sigmoid colon (CMS/HCC) [C18.7]       Post-op Diagnosis     * Malignant neoplasm of sigmoid colon (CMS/HCC) [C18.7]    Surgeon(s) and Role:     * Miguel Craig MD - Primary     * Jt Cedeño MD - Resident - Assisting     * Norris Campo MD - Resident - Assisting     * Kavin Hernández MD - Fellow    Anesthesia: General    Staff:   Circulator: Collette Graves RN  Scrub Person: David Dillrad RN; Papo Hook    Procedure Details   Robotic Anterior Resection    Estimated Blood Loss: No blood loss documented.    Specimens:                Order Name Source Comment Collection Info Order Time   REPEAT ABO/RH (TYPE CHECK) Blood, Venous  Collected By: Rossy Zhu RN 12/14/2023  5:45 AM     Release to patient   Immediate        PATHOLOGY TISSUE EXAM Large Intestine, Rectum Pre-op diagnosis:  XI Robotic Anterior Resection Collected By: Miguel Craig MD 12/14/2023 12:55 PM     Release to patient   Immediate              Drains:   Urethral Catheter Latex 16 Fr (Active)       Implants: * No implants in log *           Complications:  None; patient tolerated the procedure well.           Disposition: PACU - hemodynamically stable.           Condition: stable    Miguel Craig MD  Phone Number: [phone]

## 2023-12-14 NOTE — ANESTHESIA POSTPROCEDURE EVALUATION
Patient: Lc Soliz    Procedure Summary     Date: 12/14/23 Room / Location: [facility] OR 6 / [facility] OR    Anesthesia Start: 0659 Anesthesia Stop: 1347    Procedure: XI Robotic Anterior Resection (Abdomen) Diagnosis:       Malignant neoplasm of sigmoid colon (CMS/HCC)      (XI Robotic Anterior Resection)    Surgeons: Miguel Craig MD Responsible Provider: Papi Newell MD    Anesthesia Type: general ASA Status: 3          Anesthesia Type: general  PACU Vitals  12/14/2023 1338 - 12/14/2023 1438      12/14/2023  1345 12/14/2023  1400 12/14/2023  1415 12/14/2023  1430    BP: 147/86 149/86 150/82 151/85    Temp: 36.4 °C (97.5 °F) -- -- --    Pulse: 80 78 82 78    Resp: 20 19 16 15    SpO2: 99 % 99 % 95 % 93 %            Anesthesia Post Evaluation    Pain management: adequate  Patient location during evaluation: PACU  Patient participation: complete - patient participated  Level of consciousness: awake and alert  Cardiovascular status: acceptable  Airway Patency: adequate  Respiratory status: acceptable  Hydration status: acceptable  Anesthetic complications: no

## 2023-12-14 NOTE — ANESTHESIA PROCEDURE NOTES
Airway  Urgency: elective    Start Time: 12/14/2023 7:11 AM  Airway not difficult    General Information and Staff    Patient location during procedure: OR  Anesthesiologist: Papi Newell MD  Resident/CRNA: Latha Dent CRNA  Performed: resident/CRNA   Performed by: Latha Dent CRNA  Authorized by: Papi Newell MD      Indications and Patient Condition  Indications for airway management: anesthesia  Sedation level: deep  Preoxygenated: yes  Patient position: sniffing  Mask difficulty assessment: 1 - vent by mask    Final Airway Details  Final airway type: endotracheal airway      Successful airway: ETT  Cuffed: yes   Successful intubation technique: video laryngoscopy  Facilitating devices/methods: intubating stylet  Endotracheal tube insertion site: oral  Blade: Lisa  Blade size: #4  ETT size (mm): 7.5  Cormack-Lehane Classification: grade I - full view of glottis  Placement verified by: chest auscultation, capnometry and palpation of cuff   Measured from: lips  ETT to lips (cm): 23  Number of attempts at approach: 1  Ventilation between attempts: none  Number of other approaches attempted: 0  Atraumatic airway insertion

## 2023-12-14 NOTE — ANESTHESIOLOGIST PRE-PROCEDURE ATTESTATION
Pre-Procedure Patient Identification:  I am the Primary Anesthesiologist and have identified the patient on 12/14/23 at 6:52 AM.   I have confirmed the procedure(s) will be performed by the following surgeon/proceduralist Miguel Craig MD.

## 2023-12-14 NOTE — ANESTHESIA PREPROCEDURE EVALUATION
Relevant Problems   GASTROINTESTINAL   (+) GERD (gastroesophageal reflux disease)      HEMATOLOGY   (+) Anemia   (+) Microcytic hypochromic anemia      MUSCULOSKELETAL   (+) Arthritis   (+) Osteoarthritis      RESPIRATORY SYSTEM   (+) Obstructive sleep apnea (adult) (pediatric)   (+) Obstructive sleep apnea on CPAP   (+) Sleep apnea, unspecified      Other   (+) Hypothyroidism   (+) Malignant neoplasm of sigmoid colon (CMS/HCC)       Anesthesia ROS/MED HX      Pulmonary    Shortness of breath   Sleep apnea and CPAP Compliant  Cardiovascular   ECG reviewed  Endo/Other   Hypothyroidism  ROS/MED HX Comments:    ECG: Sinus bradycardia   Otherwise normal ECG   No previous ECGs available   Confirmed by Asaf Smith ([id number]) on 12/5/2023 2:56:20 PM       Past Surgical History:   Procedure Laterality Date   • COLONOSCOPY     • KNEE SURGERY Left        Physical Exam    Airway   Mallampati: IV   TM distance: >3 FB   Neck ROM: full  Cardiovascular - normal   Rhythm: regular   Rate: normalPulmonary - normal   clear to auscultation  Dental - normal      Patient Active Problem List   Diagnosis   • Malignant neoplasm of sigmoid colon (CMS/HCC)   • GERD (gastroesophageal reflux disease)   • Male erectile disorder   • Hypothyroidism   • Exposure to potentially hazardous substance   • Essential tremor   • Anemia   • Obstructive sleep apnea on CPAP   • Obstructive sleep apnea (adult) (pediatric)   • Sleep apnea, unspecified   • Prehypertension   • Other dyspnea and respiratory abnormality   • Hypercholesterolemia   • Seasonal allergies   • Microcytic hypochromic anemia   • Osteoarthritis   • Arthritis   • Osteoarthritis        Past Medical History:   Diagnosis Date   • Arthritis    • Disease of thyroid gland    • Diverticulitis of colon    • Dyslipidemia    • Essential tremor    • GERD (gastroesophageal reflux disease)    • Hypothyroidism    • NICOLAS on CPAP    • Seasonal allergies        Past Surgical History:   Procedure Laterality Date    • COLONOSCOPY     • KNEE SURGERY Left        Social History     Socioeconomic History   • Marital status:      Spouse name: Not on file   • Number of children: Not on file   • Years of education: Not on file   • Highest education level: Not on file   Occupational History   • Not on file   Tobacco Use   • Smoking status: Former     Packs/day: 1.00     Years: 20.00     Additional pack years: 0.00     Total pack years: 20.00     Types: Cigarettes     Quit date:      Years since quittin.9   • Smokeless tobacco: Never   Substance and Sexual Activity   • Alcohol use: Yes     Alcohol/week: 1.0 standard drink of alcohol     Types: 1 Glasses of wine per week   • Drug use: Never   • Sexual activity: Defer   Other Topics Concern   • Not on file   Social History Narrative    Lives with wife, one story home     Social Determinants of Health     Financial Resource Strain: Not on file   Food Insecurity: Not on file   Transportation Needs: Not on file   Physical Activity: Not on file   Stress: Not on file   Social Connections: Not on file   Intimate Partner Violence: Not on file   Housing Stability: Not on file       No Known Allergies    Current Facility-Administered Medications   Medication Dose Route Frequency   • ceFAZolin  2 g intravenous 60 min Pre-Op    And   • metroNIDAZOLE  500 mg intravenous 60 min Pre-Op       Prior to Admission medications    Medication Sig Start Date End Date Taking? Authorizing Provider   benzonatate (TESSALON) 100 mg capsule take 1 capsule by mouth three times a day if needed for cough 23  Yes Provider, El Mckenna MD   levothyroxine (SYNTHROID) 88 mcg tablet Take 88 mcg by mouth daily. 23  Yes ProviderEl MD   omeprazole (PriLOSEC) 20 mg capsule Take 20 mg by mouth daily before breakfast. 23  Yes ProviderEl MD   propranolol LA (INDERAL LA) 60 mg 24 hr capsule Take 60 mg by mouth daily.   Yes Provider, El Mckenna MD   simvastatin  "(ZOCOR) 20 mg tablet Take 20 mg by mouth nightly.   Yes Provider, El Mckenna MD   TRICOR 48 mg tablet Take 48 mg by mouth daily. 9/28/23  Yes Provider, El Mckenna MD   fluticasone propionate (FLONASE) 50 mcg/actuation nasal spray Administer 2 sprays into affected nostril(s) daily.    Provider, El Mckenna MD       Vitals:    12/14/23 0626   BP: (!) 155/86   Pulse: 71   Resp: 20   Temp: 37.1 °C (98.8 °F)   TempSrc: Temporal   SpO2: 98%   Weight: 102 kg (225 lb)   Height: 1.727 m (5' 8\")       CBC Results       12/05/23     1500    WBC 7.50    RBC 5.10    HGB 14.4    HCT 44.1    MCV 86.5    MCH 28.2    MCHC 32.7              BMP Results       12/05/23     1500        K 4.3    Cl 103    CO2 28    Glucose 82    BUN 11    Creatinine 1.0    Calcium 9.7    Anion Gap 7    EGFR >60.0         Comment for EGFR at 1500 on 12/05/23: Calculation based on the Chronic Kidney Disease Epidemiology Collaboration (CKD-EPI) equation refit without adjustment for race.            Anesthesia Plan    Plan: general    Technique: general endotracheal     Lines and Monitors: PIV     Airway: oral intubation   3 ASA  Induction:    intravenous     "

## 2023-12-15 LAB
ANION GAP SERPL CALC-SCNC: 6 MEQ/L (ref 3–15)
BASOPHILS # BLD: 0.02 K/UL (ref 0.01–0.1)
BASOPHILS NFR BLD: 0.2 %
BUN SERPL-MCNC: 9 MG/DL (ref 7–25)
CALCIUM SERPL-MCNC: 8 MG/DL (ref 8.6–10.3)
CHLORIDE SERPL-SCNC: 106 MEQ/L (ref 98–107)
CO2 SERPL-SCNC: 26 MEQ/L (ref 21–31)
CREAT SERPL-MCNC: 0.9 MG/DL (ref 0.7–1.3)
CRP SERPL-MCNC: 50.9 MG/L
DIFFERENTIAL METHOD BLD: ABNORMAL
EGFRCR SERPLBLD CKD-EPI 2021: >60 ML/MIN/1.73M*2
EOSINOPHIL # BLD: 0.03 K/UL (ref 0.04–0.54)
EOSINOPHIL NFR BLD: 0.3 %
ERYTHROCYTE [DISTWIDTH] IN BLOOD BY AUTOMATED COUNT: 15.9 % (ref 11.6–14.4)
GLUCOSE SERPL-MCNC: 96 MG/DL (ref 70–99)
HCT VFR BLDCO AUTO: 37.4 % (ref 40.1–51)
HGB BLD-MCNC: 11.9 G/DL (ref 13.7–17.5)
IMM GRANULOCYTES # BLD AUTO: 0.04 K/UL (ref 0–0.08)
IMM GRANULOCYTES NFR BLD AUTO: 0.5 %
LYMPHOCYTES # BLD: 1.13 K/UL (ref 1.2–3.5)
LYMPHOCYTES NFR BLD: 13 %
MAGNESIUM SERPL-MCNC: 1.7 MG/DL (ref 1.8–2.5)
MCH RBC QN AUTO: 28 PG (ref 28–33.2)
MCHC RBC AUTO-ENTMCNC: 31.8 G/DL (ref 32.2–36.5)
MCV RBC AUTO: 88 FL (ref 83–98)
MONOCYTES # BLD: 0.71 K/UL (ref 0.3–1)
MONOCYTES NFR BLD: 8.2 %
NEUTROPHILS # BLD: 6.77 K/UL (ref 1.7–7)
NEUTS SEG NFR BLD: 77.8 %
NRBC BLD-RTO: 0 %
PDW BLD AUTO: 10.4 FL (ref 9.4–12.4)
PHOSPHATE SERPL-MCNC: 3.3 MG/DL (ref 2.4–4.7)
PLATELET # BLD AUTO: 174 K/UL (ref 150–350)
POTASSIUM SERPL-SCNC: 3.9 MEQ/L (ref 3.5–5.1)
RBC # BLD AUTO: 4.25 M/UL (ref 4.5–5.8)
SODIUM SERPL-SCNC: 138 MEQ/L (ref 136–145)
WBC # BLD AUTO: 8.7 K/UL (ref 3.8–10.5)

## 2023-12-15 PROCEDURE — 97161 PT EVAL LOW COMPLEX 20 MIN: CPT | Mod: GP

## 2023-12-15 PROCEDURE — 86140 C-REACTIVE PROTEIN: CPT

## 2023-12-15 PROCEDURE — 63600000 HC DRUGS/DETAIL CODE: Mod: JZ

## 2023-12-15 PROCEDURE — 80048 BASIC METABOLIC PNL TOTAL CA: CPT

## 2023-12-15 PROCEDURE — 84100 ASSAY OF PHOSPHORUS: CPT

## 2023-12-15 PROCEDURE — 25800000 HC PHARMACY IV SOLUTIONS

## 2023-12-15 PROCEDURE — 83735 ASSAY OF MAGNESIUM: CPT

## 2023-12-15 PROCEDURE — 63700000 HC SELF-ADMINISTRABLE DRUG

## 2023-12-15 PROCEDURE — 97165 OT EVAL LOW COMPLEX 30 MIN: CPT | Mod: GO

## 2023-12-15 PROCEDURE — 85025 COMPLETE CBC W/AUTO DIFF WBC: CPT

## 2023-12-15 PROCEDURE — 36415 COLL VENOUS BLD VENIPUNCTURE: CPT

## 2023-12-15 PROCEDURE — 97535 SELF CARE MNGMENT TRAINING: CPT | Mod: GO

## 2023-12-15 PROCEDURE — 63600000 HC DRUGS/DETAIL CODE

## 2023-12-15 PROCEDURE — 25000000 HC PHARMACY GENERAL

## 2023-12-15 PROCEDURE — 21400000 HC ROOM AND CARE CCU/INTERMEDIATE

## 2023-12-15 RX ORDER — DEXTROSE MONOHYDRATE, SODIUM CHLORIDE, AND POTASSIUM CHLORIDE 50; 1.49; 4.5 G/1000ML; G/1000ML; G/1000ML
INJECTION, SOLUTION INTRAVENOUS CONTINUOUS
Status: DISCONTINUED | OUTPATIENT
Start: 2023-12-15 | End: 2023-12-16 | Stop reason: HOSPADM

## 2023-12-15 RX ADMIN — PANTOPRAZOLE SODIUM 40 MG: 40 INJECTION, POWDER, LYOPHILIZED, FOR SOLUTION INTRAVENOUS at 08:28

## 2023-12-15 RX ADMIN — ALVIMOPAN 12 MG: 12 CAPSULE ORAL at 20:33

## 2023-12-15 RX ADMIN — MAGNESIUM SULFATE IN WATER 4 G: 40 INJECTION, SOLUTION INTRAVENOUS at 10:50

## 2023-12-15 RX ADMIN — CEFAZOLIN 1 G: 1 INJECTION, POWDER, FOR SOLUTION INTRAVENOUS at 01:20

## 2023-12-15 RX ADMIN — METRONIDAZOLE 500 MG: 5 INJECTION, SOLUTION INTRAVENOUS at 16:55

## 2023-12-15 RX ADMIN — METRONIDAZOLE 500 MG: 5 INJECTION, SOLUTION INTRAVENOUS at 01:24

## 2023-12-15 RX ADMIN — ACETAMINOPHEN 975 MG: 325 TABLET ORAL at 03:58

## 2023-12-15 RX ADMIN — PROPRANOLOL HYDROCHLORIDE 60 MG: 60 CAPSULE, EXTENDED RELEASE ORAL at 08:29

## 2023-12-15 RX ADMIN — POTASSIUM CHLORIDE, DEXTROSE MONOHYDRATE AND SODIUM CHLORIDE: 150; 5; 450 INJECTION, SOLUTION INTRAVENOUS at 20:30

## 2023-12-15 RX ADMIN — CEFAZOLIN 1 G: 1 INJECTION, POWDER, FOR SOLUTION INTRAVENOUS at 16:56

## 2023-12-15 RX ADMIN — ACETAMINOPHEN 975 MG: 325 TABLET ORAL at 20:35

## 2023-12-15 RX ADMIN — LEVOTHYROXINE SODIUM 88 MCG: 88 TABLET ORAL at 07:04

## 2023-12-15 RX ADMIN — GABAPENTIN 200 MG: 100 CAPSULE ORAL at 08:28

## 2023-12-15 RX ADMIN — KETOROLAC TROMETHAMINE 10 MG: 15 INJECTION, SOLUTION INTRAMUSCULAR; INTRAVENOUS at 08:30

## 2023-12-15 RX ADMIN — GABAPENTIN 200 MG: 100 CAPSULE ORAL at 14:40

## 2023-12-15 RX ADMIN — ACETAMINOPHEN 975 MG: 325 TABLET ORAL at 08:29

## 2023-12-15 RX ADMIN — KETOROLAC TROMETHAMINE 10 MG: 15 INJECTION, SOLUTION INTRAMUSCULAR; INTRAVENOUS at 20:36

## 2023-12-15 RX ADMIN — CEFAZOLIN 1 G: 1 INJECTION, POWDER, FOR SOLUTION INTRAVENOUS at 08:27

## 2023-12-15 RX ADMIN — KETOROLAC TROMETHAMINE 10 MG: 15 INJECTION, SOLUTION INTRAMUSCULAR; INTRAVENOUS at 15:11

## 2023-12-15 RX ADMIN — KETOROLAC TROMETHAMINE 10 MG: 15 INJECTION, SOLUTION INTRAMUSCULAR; INTRAVENOUS at 03:58

## 2023-12-15 RX ADMIN — POTASSIUM CHLORIDE, DEXTROSE MONOHYDRATE AND SODIUM CHLORIDE: 150; 5; 450 INJECTION, SOLUTION INTRAVENOUS at 07:03

## 2023-12-15 RX ADMIN — GABAPENTIN 200 MG: 100 CAPSULE ORAL at 20:35

## 2023-12-15 RX ADMIN — METRONIDAZOLE 500 MG: 5 INJECTION, SOLUTION INTRAVENOUS at 09:02

## 2023-12-15 RX ADMIN — ALVIMOPAN 12 MG: 12 CAPSULE ORAL at 08:28

## 2023-12-15 ASSESSMENT — COGNITIVE AND FUNCTIONAL STATUS - GENERAL
DRESSING REGULAR LOWER BODY CLOTHING: 4 - NONE
WALKING IN HOSPITAL ROOM: 3 - A LITTLE
MOVING TO AND FROM BED TO CHAIR: 3 - A LITTLE
EATING MEALS: 4 - NONE
HELP NEEDED FOR PERSONAL GROOMING: 4 - NONE
CLIMB 3 TO 5 STEPS WITH RAILING: 4 - NONE
STANDING UP FROM CHAIR USING ARMS: 3 - A LITTLE
DRESSING REGULAR UPPER BODY CLOTHING: 4 - NONE
STANDING UP FROM CHAIR USING ARMS: 4 - NONE
WALKING IN HOSPITAL ROOM: 4 - NONE
CLIMB 3 TO 5 STEPS WITH RAILING: 4 - NONE
WALKING IN HOSPITAL ROOM: 4 - NONE
AFFECT: WFL
TOILETING: 1 - TOTAL
AFFECT: WFL
STANDING UP FROM CHAIR USING ARMS: 4 - NONE
MOVING TO AND FROM BED TO CHAIR: 4 - NONE
MOVING TO AND FROM BED TO CHAIR: 4 - NONE
HELP NEEDED FOR BATHING: 4 - NONE
CLIMB 3 TO 5 STEPS WITH RAILING: 3 - A LITTLE

## 2023-12-15 NOTE — PROGRESS NOTES
Daily Progress Note    Subjective  POD #1 s/p robotic anterior resection with Dr. Craig on 12/14/23 for sigmoid adenocarcinoma     Interval History  NAEON. Resting comfortably this morning, stating his pain is well controlled. Has had some liquids and tolerated them without nausea or vomiting. Encouraged to ambulate in the halls and be out of bed for the majority of the day.     Objective  Vital signs in last 24 hours:  Temp:  [36.4 °C (97.5 °F)-36.6 °C (97.9 °F)] 36.4 °C (97.5 °F)  Heart Rate:  [62-82] 72  Resp:  [13-20] 16  BP: (128-151)/(63-90) 129/77      Intake/Output Summary (Last 24 hours) at 12/15/2023 1033  Last data filed at 12/15/2023 0902  Gross per 24 hour   Intake 1510 ml   Output 1875 ml   Net -365 ml       Labs  Lab Results   Component Value Date    WBC 8.70 12/15/2023    HGB 11.9 (L) 12/15/2023    HCT 37.4 (L) 12/15/2023     12/15/2023    ALT 13 12/05/2023    AST 16 12/05/2023     12/15/2023    K 3.9 12/15/2023     12/15/2023    CREATININE 0.9 12/15/2023    BUN 9 12/15/2023    CO2 26 12/15/2023       Physical Exam  General appearance: alert, appears stated age and cooperative, sitting comfortably in bed  Head: normocephalic, without obvious abnormality, atraumatic  Neck: supple, symmetrical, trachea midline   Lungs: no increased breathing effort, bilateral chest rise & fall   Heart: regular rate and rhythm  Abdomen: appropriately tender to palpation, soft, minimally distended  Extremities: extremities normal, warm and well-perfused  Skin: Skin color & texture normal. No rashes or lesions  Neurologic: grossly normal    Imaging  I have reviewed the imaging in the past 24 hours     Assessment & Plan  Sancho Soliz is a 68 year old male s/p robotic anterior resection with Dr. Craig on 12/14/23 for sigmoid adenocarcinoma     -Continue clear liquid diet  -IVF at 80 cc/hour   -Multimodal pain control  -F/u AM labs   -IS/OOB    GERRI Frausto  General Surgery  Please page [phone] with any  questions

## 2023-12-15 NOTE — PLAN OF CARE
Care Coordination Admission Assessment Note    General Information:  Readmission Within the last 30 days: no previous admission in last 30 days  Does patient have a :    Patient-Specific Goals (include timeframe): Symptom Improvement    Living Arrangements:  Arrived From: home  Current Living Arrangements: home  People in Home: spouse  Home Accessibility: stairs to enter home (Group)  Living Arrangement Comments: 1SH, 1 SANDHYA, 1st floor bed and bath    Housing Stability and Utility Access (SDOH):  In the last 12 months, was there a time when you were not able to pay the mortgage or rent on time?: No  In the last 12 months, how many places have you lived?:    In the last 12 months, was there a time when you did not have a steady place to sleep or slept in a shelter (including now)?: No  In the past 12 months has the electric, gas, oil, or water company threatened to shut off services in your home?:      Functional Status Prior to Admission:   Assistive Device/Animal Currently Used at Home: none, CPAP  Functional Status Comments:    IADL Comments: Patient stated he is independent at home with all ADLs     Supports and Services:  Current Outpatient/Agency/Support Group: none  Type of Current Home Care Services:    History of home care episode or rehab stay: Patient stated he has not been to SNF Or has HC in the past    Discharge Needs Assessment:   Concerns to be Addressed: care coordination/care conferences, discharge planning  Current Discharge Risk:    Anticipated Changes Related to Illness: none    Patient/Family Anticipated Discharge Plan:  Patient/Family Anticipates Transition To: home, home with family  Patient/Family Anticipated Services at Transition: none    Connection to Community       Patient Choice:   Offered/Gave Vendor List: no  Patient's Choice of Community Agency(s): n/a       Anticipated Discharge Plan:  Met with patient. Provided education and contact information for Care Coordination  services.: yes  Anticipated Discharge Disposition: home without assistance or services     Transportation Needs (SDOH):  Transportation Concerns: none  Transportation Anticipated: family or friend will provide  Is Out of Hospital DNR needed at discharge?:      In the past 12 months, has lack of transportation kept you from medical appointments or from getting medications?:    In the past 12 months, has lack of transportation kept you from meetings, work, or from getting things needed for daily living?:      Concerns - comments: SW completed assessment with patient. Patient confirmed demographics, insurance and pharmacy. Patient stated he has a c-pap at home that he received from the [facility]. Patient unsure of DME company. COVID vaccinations- COVID-19 Vaccine, Pfizer BIVALENT 12 years and up 10/24/2022  COVID-19 Vaccine, Pfizer Monovalent 10/24/2022 , 1/24/2022 , 3/27/2021 , 3/6/2021  COVID-19 Vaccine, Pfizer Monovalent Vin-Sucrose Stout Cap 1/24/2022.

## 2023-12-15 NOTE — CONSULTS
"Brief Nutrition Note    Recommendations     Advance diet as feasible to Low Fiber  Obtain standing scale weight       Clinical Course: Patient is a 68 y.o. male who was admitted on 12/14/2023 with a diagnosis of Malignant neoplasm of sigmoid colon (CMS/HCC) [C18.7]  Adenocarcinoma of sigmoid colon (CMS/HCC) [C18.7].     Past Medical History:   Diagnosis Date   • Arthritis    • Disease of thyroid gland    • Diverticulitis of colon    • Dyslipidemia    • Essential tremor    • GERD (gastroesophageal reflux disease)    • Hypothyroidism    • NICOLAS on CPAP    • Seasonal allergies      Past Surgical History:   Procedure Laterality Date   • COLONOSCOPY     • KNEE SURGERY Left        Reason for Assessment  Reason For Assessment: identified at risk by screening criteria  Diagnosis: gastrointestinal disease    [facility] Nutrition Screen Tool  Has patient lost weight without trying?: 2-->Yes, 14-23 lbs  Has patient been eating poorly due to decreased appetite?: 0-->No  [facility] Nutrition Screen Score: 2     Nutrition/Diet History  Diet Prior to Admission: regular  Appetite Prior to Admission: [id number]    Physical Findings  Skin: surgical incision     [id number] Physical Appearance  Skin: surgical incision     Nutrition Order  Nutrition Order: does not meet nutritional requirements  Nutrition Order Comments: clear liquid, low fiber, low lactose, no carbonation     Anthropometrics  Height: 177.8 cm (5' 10\")           Current Weight  Weight Method: Bed scale  Weight: 93.7 kg (206 lb 9.1 oz)     Ideal Body Weight (IBW)  Ideal Body Weight (IBW) (kg): 76.48  % Ideal Body Weight: 122.52            Body Mass Index (BMI)  BMI (Calculated): 29.6     Labs/Procedures/Meds  Lab Results Reviewed: reviewed, pertinent   BMP Results       12/15/23 12/05/23     0710 1500     138    K 3.9 4.3    Cl 106 103    CO2 26 28    Glucose 96 82    BUN 9 11    Creatinine 0.9 1.0    Calcium 8.0 9.7    Anion Gap 6 7    EGFR >60.0 >60.0         Comment for EGFR " at 0710 on 12/15/23: Calculation based on the Chronic Kidney Disease Epidemiology Collaboration (CKD-EPI) equation refit without adjustment for race.    Comment for EGFR at 1500 on 12/05/23: Calculation based on the Chronic Kidney Disease Epidemiology Collaboration (CKD-EPI) equation refit without adjustment for race.            Medications  Pertinent Medications Reviewed: reviewed, pertinent   • acetaminophen  975 mg oral q6h INT   • alvimopan  12 mg oral BID   • ceFAZolin  1 g intravenous q8h INT    And   • metroNIDAZOLE  500 mg intravenous q8h INT   • gabapentin  200 mg oral TID   • ketorolac  10 mg intravenous q6h INT   • levothyroxine  88 mcg oral Daily (6:30a)   • pantoprazole  40 mg intravenous q24h   • propranolol LA  60 mg oral Daily   IVF-D5 @ 80        Skin: surgical incision    Clinical comments:  Nutrition screen for MST. Pt reports having PNA in November and had a decreased appetite but has been eating well since.  EMR suggests 19# wt loss in 1 month which is significant.  NFPE WNL. Pt is not concerned about the wt loss and is happy about it. Obtain wt on a standing scale.  Ate 100% of breakfast and looking forward to diet advancement. Discussed low fiber diet and attached diet materials.     Goals: Diet advance in 24hr  Monitor: diet, appetite, bowel function, weight    Recommendations: See above       Date: 12/15/23  Signature: Makenzie Bhakta RD

## 2023-12-15 NOTE — PROGRESS NOTES
Occupational Therapy -  Initial Evaluation     Patient: Lc Soliz  Location: [place] [address]  MRN: [id number]  Today's date: 12/15/2023    HISTORY OF PRESENT ILLNESS     Sancho is a 68 y.o. male admitted on 12/14/2023 with Malignant neoplasm of sigmoid colon (CMS/HCC) [C18.7]  Adenocarcinoma of sigmoid colon (CMS/HCC) [C18.7]. Principal problem is Adenocarcinoma of sigmoid colon (CMS/HCC).    Past Medical History  Sancho has a past medical history of Arthritis, Disease of thyroid gland, Diverticulitis of colon, Dyslipidemia, Essential tremor, GERD (gastroesophageal reflux disease), Hypothyroidism, NICOLAS on CPAP, and Seasonal allergies.    History of Present Illness  Pt p/w sigmoid adenoCA now s/p robotic anterior resection with Dr. Craig on 12/14/23     PRIOR LEVEL OF FUNCTION AND LIVING ENVIRONMENT     Prior Level of Function    Flowsheet Row Most Recent Value   Dominant Hand right   Ambulation independent   Transferring independent   Toileting independent   Bathing independent   Dressing independent   Eating independent   IADLs independent   Driving/Transportation    Communication understands/communicates without difficulty   Prior Level of Function Comment independent PTA no AD. Has SPC but does not use. +.   Assistive Device Currently Used at Home cane, straight        Prior Living Environment    Flowsheet Row Most Recent Value   People in Home spouse   Current Living Arrangements home   Living Environment Comment 1STH 1STE. Tub shower.        Occupational Profile    Flowsheet Row Most Recent Value   Successful Occupations retired    Performance Patterns enjoys reading, toy trains and collectibles   Environmental Supports and Barriers supportive spouse and family        VITALS AND PAIN     OT Vitals    Date/Time Pulse HR Source SpO2 Pt Activity O2 Therapy BP MAP BP Location BP Method Pt Position [facility]   12/15/23 1020 72 -- 92 % At rest None (Room air)  129/77 99 mmHg Right upper arm Automatic Sitting DA   12/15/23 1041 -- Monitor 94 % At rest None (Room air) 133/66 -- Right upper arm Automatic Sitting DA      OT Pain    Date/Time Pain Type Location Rating: Rest Rating: Activity [facility]   12/15/23 1020 Pain Assessment abdomen 2 - mild pain 2 - mild pain DA           Objective   OBJECTIVE     Start time:  1017  End time:  1042  Session Length: 25 min  Mode of Treatment: individual therapy, occupational therapy    General Observations  Patient received upright, in chair. He was agreeable to therapy, no issues or concerns identified by nurse prior to session. sitting up in chair, IV, carroll    Precautions: fall, oxygen therapy device and L/min        Services  Do You Speak a Language Other Than English at Home?: no  Preferred Language: English      OT Eval and Treat - 12/15/23 1019        Cognition    Orientation Status --     Affect/Mental Status --     Follows Commands --     Cognitive Function --        Vision Assessment/Intervention    Vision Assessment --        Hearing Assessment    Hearing Status --        Sensory Assessment    Sensory Assessment --        Upper Extremity Assessment    UE Assessment --        Mobility Belt    Mobility Belt Used for All Out of Bed Activity --                               Education Documentation  Activity, taught by Staci Farrell OT at 12/15/2023 12:59 PM.  Learner: Family, Patient  Readiness: Acceptance  Method: Explanation  Response: Verbalizes Understanding  Comment: safety, transfers, ADL mods, OT role        Session Outcome  Patient in chair, upright at end of session, chair alarm on, personal items in reach, all needs met, call light in reach. Nursing notified about patient's performance, patient's position, and patient's response to therapy/activity.    AM-PAC™ - ADL (Current Function)     Putting on/taking off regular lower body clothing 4 - None   Bathing 4 - None   Toileting 1 - Total (carroll)   Putting  on/taking off regular upper body clothing 4 - None   Help for taking care of personal grooming 4 - None   Eating meals 4 - None   AM-PAC™ ADL Score 21      ASSESSMENT AND PLAN     Progress Summary  OT eval complete. Pt Independent PTA. S/p robotic anterior resection. Remains Independent with ADLs and functional mobility/transfers with no AD. Mild discomfort. Educated on ADL modifications, transfer tech and safety precautions. Pt safe to d/c home with spouse assist as needed. D/c OT.    Patient/Family Therapy Goal Statement: return home    OT Plan    Flowsheet Row Most Recent Value   Therapy Frequency evaluation only at 12/15/2023 1017          OT Discharge Recommendations    Flowsheet Row Most Recent Value   OT Recommended Discharge Disposition home at 12/15/2023 1017   Anticipated Equipment Needs if Discharged Home (OT) none at 12/15/2023 1017

## 2023-12-15 NOTE — HOSPITAL COURSE
Sancho is a 68 y.o. male admitted on 12/14/2023 with Malignant neoplasm of sigmoid colon (CMS/HCC) [C18.7]  Adenocarcinoma of sigmoid colon (CMS/HCC) [C18.7]. Principal problem is Adenocarcinoma of sigmoid colon (CMS/HCC).    Past Medical History  Sancho has a past medical history of Arthritis, Disease of thyroid gland, Diverticulitis of colon, Dyslipidemia, Essential tremor, GERD (gastroesophageal reflux disease), Hypothyroidism, NICOLAS on CPAP, and Seasonal allergies.    History of Present Illness  Pt p/w sigmoid adenoCA now s/p robotic anterior resection with Dr. Craig on 12/14/23

## 2023-12-15 NOTE — PROGRESS NOTES
Physical Therapy -  Initial Evaluation     Patient: Lc Soliz  Location: [place] [address]  MRN: [id number]  Today's date: 12/15/2023    HISTORY OF PRESENT ILLNESS     Sancho is a 68 y.o. male admitted on 12/14/2023 with Malignant neoplasm of sigmoid colon (CMS/HCC) [C18.7]  Adenocarcinoma of sigmoid colon (CMS/HCC) [C18.7]. Principal problem is Adenocarcinoma of sigmoid colon (CMS/HCC).    Past Medical History  Sancho has a past medical history of Arthritis, Disease of thyroid gland, Diverticulitis of colon, Dyslipidemia, Essential tremor, GERD (gastroesophageal reflux disease), Hypothyroidism, NICOLAS on CPAP, and Seasonal allergies.    History of Present Illness  Pt p/w sigmoid adenoCA now s/p robotic anterior resection with Dr. Craig on 12/14/23     PRIOR LEVEL OF FUNCTION AND LIVING ENVIRONMENT     Prior Level of Function    Flowsheet Row Most Recent Value   Dominant Hand right   Ambulation independent   Transferring independent   Toileting independent   Bathing independent   Dressing independent   Eating independent   IADLs independent   Driving/Transportation    Communication understands/communicates without difficulty   Prior Level of Function Comment independent PTA no AD. Has SPC but does not use. +.   Assistive Device Currently Used at Home none, CPAP        Prior Living Environment    Flowsheet Row Most Recent Value   People in Home spouse   Current Living Arrangements home   Home Accessibility stairs to enter home (Group)   Living Environment Comment 1STH 1STE. Tub shower.        VITALS AND PAIN     PT Vitals    Date/Time Pulse SpO2 Pt Activity O2 Therapy BP MAP BP Location BP Method Pt Position Observations [facility]   12/15/23 1201 62 -- At rest None (Room air) -- -- -- -- -- -- [facility]   12/15/23 1205 72 96 % At rest None (Room air) 130/68 93 mmHg Right upper arm Automatic Sitting after ambulation [facility]      PT Pain    Date/Time Pain Type Location Rating: Rest  Rating: Activity Description Interventions [facility]   12/15/23 1205 Pain Assessment abdomen 2 - mild pain 2 - mild pain incisional position adjusted [facility]           Objective   OBJECTIVE     Start time:  1200  End time:  1211  Session Length: 11 min  Mode of Treatment: physical therapy, individual therapy    General Observations  Patient received in bathroom. He was agreeable to therapy, no issues or concerns identified by nurse prior to session. IV, carroll; received in bathroom    Precautions: fall              PT Eval and Treat - 12/15/23 1200        Cognition    Orientation Status oriented x 4     Affect/Mental Status WFL     Follows Commands follows multi-step commands     Comment, Cognition pleasant, cooperative. receptive to education. good insight into limitations.        Vision Assessment/Intervention    Vision Assessment corrective lenses full-time        Hearing Assessment    Hearing Status WFL        Sensory Assessment    Sensory Assessment sensation intact, lower extremities        Lower Extremity Assessment    LE Assessment ROM and Strength WFL        Mobility Belt    Mobility Belt Used for All Out of Bed Activity no     Reason Mobility Belt Not Used medical contraindication     Reason Mobility Belt Not Used abd incision        Sit/Stand Transfer    Surface chair with arm rests   + toilet    Cove independent     Assistive Device none     Transfer Comments stood from toilet, sat to recliner        Gait Training    Cove, Gait independent     Assistive Device none     Distance in Feet 150 feet     Comment (Gait/Stairs) dec yesi. no LOB, no HUGHES.        Stairs Training    Cove, Stairs independent     Handrail Location (Stairs) right side (ascending)     Number of Stairs 4     Ascending Stairs Technique step-over-step     Descending Stairs Technique step-over-step     Comment discussed benefits of step-to pattern for full flights for act tolerance & pacing        Balance    Static Sitting  Balance WFL     Dynamic Sitting Balance WFL     Sit to Stand Dynamic Balance WFL     Static Standing Balance WFL     Dynamic Standing Balance WFL        Impairments/Safety Issues    Impairments Affecting Function pain                               Education Documentation  Treatment Plan, taught by Anahy Funez, PT at 12/15/2023  2:48 PM.  Learner: Patient  Readiness: Acceptance  Method: Explanation  Response: Verbalizes Understanding  Comment: role of PT, benefits of mobility, pacing activity, monitoring symptoms        Session Outcome  Patient upright, in chair at end of session, chair alarm on, call light in reach, personal items in reach, all needs met. Nursing notified about patient's position, patient's performance, and patient's response to therapy/activity.    AM-PAC™ - Mobility (Current Function)     Turning form your back to your side while in flat bed without using bedrails 4 - None   Moving from lying on your back to sitting on the side of a flat bed without using bedrails 4 - None   Moving to and from a bed to a chair 4 - None   Standing up from a chair using your arms 4 - None   To walk in a hospital room 4 - None   Climbing 3-5 steps with a railing 4 - None   AM-PAC™ Mobility Score 24      ASSESSMENT AND PLAN     Progress Summary  PT eval complete. Pt IND w/ all functional mobility; cleared for d/c home.    Patient/Family Therapy Goals Statement: to go home    PT Plan    Flowsheet Row Most Recent Value   Therapy Frequency evaluation only at 12/15/2023 1200          PT Discharge Recommendations    Flowsheet Row Most Recent Value   PT Recommended Discharge Disposition home with assistance at 12/15/2023 1200   Anticipated Equipment Needs if Discharged Home (PT) none at 12/15/2023 1200

## 2023-12-15 NOTE — PLAN OF CARE
Problem: Adult Inpatient Plan of Care  Goal: Plan of Care Review  Outcome: Progressing  Flowsheets (Taken 12/15/2023 7767)  Progress: improving  Outcome Evaluation:   PT eval complete. Pt IND w/ all functional mobility   cleared for d/c home.  Plan of Care Reviewed With: patient

## 2023-12-15 NOTE — PLAN OF CARE
Care Coordination Discharge Plan Note     Discharge Needs Assessment  Concerns to be Addressed: care coordination/care conferences, discharge planning  Current Discharge Risk:      Anticipated Discharge Plan  Anticipated Discharge Disposition: home with assistance       Patient Choice  Offered/Gave Vendor List: no  Patient's Choice of Community Agency(s): n/a       Interdisciplinary Discharge Plan Review:  Participants:nursing, , patient, social work/services, physician    Concerns Comments: Per updates from DCP rounds, pt is a 69 y/o M adm w/ AdenoCA of sigmoid colon, 12-14 robotic resection. On CLD with plans to adv to [facility] 12-16. IVF cont. Pain mgmt in place. No HHC needs anticipated at DC. Family to provide transort home. CM will continue to offer support and follow for needs until DC home complete.    Discharge Plan:   Disposition/Destination: Home  / Home  Discharge Facility:  n/a  Community Resources:  n/a    Discharge Transportation:  Is Out of Hospital DNR needed at Discharge:  n/a  Does patient need discharge transport? No

## 2023-12-16 VITALS
SYSTOLIC BLOOD PRESSURE: 158 MMHG | HEART RATE: 60 BPM | RESPIRATION RATE: 16 BRPM | OXYGEN SATURATION: 95 % | TEMPERATURE: 98.3 F | WEIGHT: 206.57 LBS | BODY MASS INDEX: 29.57 KG/M2 | HEIGHT: 70 IN | DIASTOLIC BLOOD PRESSURE: 80 MMHG

## 2023-12-16 LAB
ANION GAP SERPL CALC-SCNC: 8 MEQ/L (ref 3–15)
BASOPHILS # BLD: 0.03 K/UL (ref 0.01–0.1)
BASOPHILS NFR BLD: 0.4 %
BUN SERPL-MCNC: 6 MG/DL (ref 7–25)
CALCIUM SERPL-MCNC: 8.1 MG/DL (ref 8.6–10.3)
CHLORIDE SERPL-SCNC: 107 MEQ/L (ref 98–107)
CO2 SERPL-SCNC: 24 MEQ/L (ref 21–31)
CREAT SERPL-MCNC: 0.8 MG/DL (ref 0.7–1.3)
CRP SERPL-MCNC: 62.1 MG/L
DIFFERENTIAL METHOD BLD: ABNORMAL
EGFRCR SERPLBLD CKD-EPI 2021: >60 ML/MIN/1.73M*2
EOSINOPHIL # BLD: 0.16 K/UL (ref 0.04–0.54)
EOSINOPHIL NFR BLD: 2 %
ERYTHROCYTE [DISTWIDTH] IN BLOOD BY AUTOMATED COUNT: 15.9 % (ref 11.6–14.4)
GLUCOSE SERPL-MCNC: 100 MG/DL (ref 70–99)
HCT VFR BLDCO AUTO: 38.1 % (ref 40.1–51)
HGB BLD-MCNC: 12.3 G/DL (ref 13.7–17.5)
IMM GRANULOCYTES # BLD AUTO: 0.03 K/UL (ref 0–0.08)
IMM GRANULOCYTES NFR BLD AUTO: 0.4 %
LYMPHOCYTES # BLD: 0.99 K/UL (ref 1.2–3.5)
LYMPHOCYTES NFR BLD: 12.3 %
MAGNESIUM SERPL-MCNC: 1.8 MG/DL (ref 1.8–2.5)
MCH RBC QN AUTO: 28.1 PG (ref 28–33.2)
MCHC RBC AUTO-ENTMCNC: 32.3 G/DL (ref 32.2–36.5)
MCV RBC AUTO: 87 FL (ref 83–98)
MONOCYTES # BLD: 0.63 K/UL (ref 0.3–1)
MONOCYTES NFR BLD: 7.8 %
NEUTROPHILS # BLD: 6.22 K/UL (ref 1.7–7)
NEUTS SEG NFR BLD: 77.1 %
NRBC BLD-RTO: 0 %
PDW BLD AUTO: 10.7 FL (ref 9.4–12.4)
PHOSPHATE SERPL-MCNC: 1.6 MG/DL (ref 2.4–4.7)
PLATELET # BLD AUTO: 189 K/UL (ref 150–350)
POTASSIUM SERPL-SCNC: 4 MEQ/L (ref 3.5–5.1)
RBC # BLD AUTO: 4.38 M/UL (ref 4.5–5.8)
SODIUM SERPL-SCNC: 139 MEQ/L (ref 136–145)
WBC # BLD AUTO: 8.06 K/UL (ref 3.8–10.5)

## 2023-12-16 PROCEDURE — 25800000 HC PHARMACY IV SOLUTIONS

## 2023-12-16 PROCEDURE — 63600000 HC DRUGS/DETAIL CODE

## 2023-12-16 PROCEDURE — 25000000 HC PHARMACY GENERAL

## 2023-12-16 PROCEDURE — 63700000 HC SELF-ADMINISTRABLE DRUG

## 2023-12-16 PROCEDURE — 84100 ASSAY OF PHOSPHORUS: CPT

## 2023-12-16 PROCEDURE — 83735 ASSAY OF MAGNESIUM: CPT

## 2023-12-16 PROCEDURE — 85025 COMPLETE CBC W/AUTO DIFF WBC: CPT

## 2023-12-16 PROCEDURE — 86140 C-REACTIVE PROTEIN: CPT

## 2023-12-16 PROCEDURE — 36415 COLL VENOUS BLD VENIPUNCTURE: CPT

## 2023-12-16 PROCEDURE — 63600000 HC DRUGS/DETAIL CODE: Mod: JZ

## 2023-12-16 PROCEDURE — 80048 BASIC METABOLIC PNL TOTAL CA: CPT

## 2023-12-16 RX ORDER — DOXYCYCLINE 100 MG/1
100 CAPSULE ORAL 2 TIMES DAILY
Qty: 10 CAPSULE | Refills: 0 | Status: SHIPPED | OUTPATIENT
Start: 2023-12-17 | End: 2023-12-22

## 2023-12-16 RX ADMIN — LEVOTHYROXINE SODIUM 88 MCG: 88 TABLET ORAL at 08:27

## 2023-12-16 RX ADMIN — KETOROLAC TROMETHAMINE 10 MG: 15 INJECTION, SOLUTION INTRAMUSCULAR; INTRAVENOUS at 02:36

## 2023-12-16 RX ADMIN — METRONIDAZOLE 500 MG: 5 INJECTION, SOLUTION INTRAVENOUS at 01:33

## 2023-12-16 RX ADMIN — ACETAMINOPHEN 975 MG: 325 TABLET ORAL at 02:36

## 2023-12-16 RX ADMIN — CEFAZOLIN 1 G: 1 INJECTION, POWDER, FOR SOLUTION INTRAVENOUS at 08:25

## 2023-12-16 RX ADMIN — GABAPENTIN 200 MG: 100 CAPSULE ORAL at 08:26

## 2023-12-16 RX ADMIN — ACETAMINOPHEN 975 MG: 325 TABLET ORAL at 08:35

## 2023-12-16 RX ADMIN — METRONIDAZOLE 500 MG: 5 INJECTION, SOLUTION INTRAVENOUS at 10:04

## 2023-12-16 RX ADMIN — PANTOPRAZOLE SODIUM 40 MG: 40 INJECTION, POWDER, LYOPHILIZED, FOR SOLUTION INTRAVENOUS at 08:27

## 2023-12-16 RX ADMIN — GABAPENTIN 200 MG: 100 CAPSULE ORAL at 13:14

## 2023-12-16 RX ADMIN — KETOROLAC TROMETHAMINE 10 MG: 15 INJECTION, SOLUTION INTRAMUSCULAR; INTRAVENOUS at 08:27

## 2023-12-16 RX ADMIN — PROPRANOLOL HYDROCHLORIDE 60 MG: 60 CAPSULE, EXTENDED RELEASE ORAL at 08:29

## 2023-12-16 RX ADMIN — POTASSIUM PHOSPHATE, MONOBASIC POTASSIUM PHOSPHATE, DIBASIC 15 MMOL: 224; 236 INJECTION, SOLUTION, CONCENTRATE INTRAVENOUS at 15:48

## 2023-12-16 RX ADMIN — ALVIMOPAN 12 MG: 12 CAPSULE ORAL at 08:26

## 2023-12-16 RX ADMIN — CEFAZOLIN 1 G: 1 INJECTION, POWDER, FOR SOLUTION INTRAVENOUS at 01:33

## 2023-12-16 RX ADMIN — MAGNESIUM SULFATE HEPTAHYDRATE 2 G: 40 INJECTION, SOLUTION INTRAVENOUS at 13:14

## 2023-12-16 ASSESSMENT — COGNITIVE AND FUNCTIONAL STATUS - GENERAL
CLIMB 3 TO 5 STEPS WITH RAILING: 4 - NONE
WALKING IN HOSPITAL ROOM: 4 - NONE
MOVING TO AND FROM BED TO CHAIR: 4 - NONE
STANDING UP FROM CHAIR USING ARMS: 4 - NONE

## 2023-12-16 NOTE — DISCHARGE INSTRUCTIONS
Follow-up: Please call Dr. Craig' office at [phone] upon discharge to schedule your follow-up appointment for 2 weeks. Please call Dr. Craig' office if you have any questions/concerns. Call if you experience the following: fevers (>101)/chills, nausea, vomiting, diarrhea, pain not controlled by prescribed medications, bleeding or other drainage from wound, headache, lightheadedness, dizziness or changes in vision.      Please follow-up with your primary care physician within 1 month of discharge from the hospital to update them regarding your hospital stay.      Medications: Please resume all of your previous home medications unless otherwise indicated. AVOID blood thinning medications such as NSAIDS (Aleve, Advil, Ibuprofen, Motrin, Aspirin, ect)  - You will also be discharged on 5 days of Doxycycline. Please take all of the antibiotic as prescribed until you have completed the prescription.      Pain: Take your prescribed pain medication as needed for severe pain. Take Tylenol 650mg every 4 hours as needed for mild or moderate pain.     Diet: Low residue, low fiber. Try to avoid fresh fruits and vegetables, bran, whole grains, carbonated beverages and dairy products.     Activity: Do not lift more than 10 lbs for 2 weeks.  You can be a passenger in a car but no driving until your first follow up visit.  Light activity such as walking or stairs is OK.  Please try to avoid vigorous activity including core workouts.     Wound:   Please shower daily - let soap and water run over your incision but do not directly scrub the wounds.  You will either have surgical glue or steri-strips covering your incisions - please do not remove these until your first follow up appointment. No tub baths or swimming until seen in follow-up. No lotions or ointments to wounds.        Dr. Miguel Craig and Dr. Delroy Wren  [facility]  [address]  [address] [place] [address]  [phone]

## 2023-12-16 NOTE — PROGRESS NOTES
Daily Progress Note    Subjective  POD #2 s/p robotic low anterior resection with Dr. Craig on 12/14/23 for sigmoid adenocarcinoma     Interval History  NAEON. Afebrile and VSS.  Tolerating CLD w/o nausea or vomiting. Denies fever, chills, SOB, or chest pain.  Has been out of bed and ambulating without any issues.  PT OT is recommending home with assistance.  States he is passing flatus and having bowel movements.  No new concerns.    Objective  Vital signs in last 24 hours:  Temp:  [36.4 °C (97.5 °F)-36.9 °C (98.5 °F)] 36.9 °C (98.5 °F)  Heart Rate:  [54-81] 62  Resp:  [16-18] 18  BP: (127-157)/(63-77) 157/74      Intake/Output Summary (Last 24 hours) at 12/15/2023 2130  Last data filed at 12/15/2023 2000  Gross per 24 hour   Intake 2156 ml   Output 3675 ml   Net -1519 ml       Labs  Lab Results   Component Value Date    WBC 8.70 12/15/2023    HGB 11.9 (L) 12/15/2023    HCT 37.4 (L) 12/15/2023     12/15/2023    ALT 13 12/05/2023    AST 16 12/05/2023     12/15/2023    K 3.9 12/15/2023     12/15/2023    CREATININE 0.9 12/15/2023    BUN 9 12/15/2023    CO2 26 12/15/2023       Physical Exam  General appearance: alert, appears stated age and cooperative, sitting comfortably in bed  Head: normocephalic, without obvious abnormality, atraumatic  Neck: supple, symmetrical, trachea midline   Lungs: no increased breathing effort, bilateral chest rise & fall   Heart: regular rate and rhythm  Abdomen: minimally tender to palpation, soft, minimally distended, non tympanic  Extremities: extremities normal, warm and well-perfused  Skin: Skin color & texture normal. No rashes or lesions  Neurologic: grossly normal    Imaging  I have reviewed the imaging in the past 24 hours     Assessment & Plan  Sancho Soliz is a 68 year old male s/p robotic anterior resection with Dr. Craig on 12/14 for sigmoid adenocarcinoma     -Low residue diet  -IVF at 40 cc/hour  - Remove Rosen catheter  - cipro & flagyl abx  -Multimodal  pain control  -F/u AM labs   -IS/OOB  - MITCH 1215    Amos Washburn MD  General Surgery  Please page [phone] with any questions

## 2023-12-18 ENCOUNTER — TELEPHONE (OUTPATIENT)
Dept: COLORECTAL SURGERY | Facility: CLINIC | Age: 68
End: 2023-12-18
Payer: MEDICARE

## 2023-12-18 NOTE — TELEPHONE ENCOUNTER
Spoke w/ pt who has had swelling at surgical site since discharge, inquiring if there is anything he can do prior to post op visit to help. He denies any additional symptoms, moving his bowels daily and urinating as normal, pain well controlled with tylenol. Reviewed picture per [facility] PAC, no reason for concern at this time, will continue to monitor.

## 2023-12-18 NOTE — DISCHARGE SUMMARY
Inpatient Discharge Summary    BRIEF OVERVIEW  Admitting Provider: Miguel Craig MD  Discharge Provider: No att. providers found  Primary Care Physician at Discharge: Ron Baig [phone]     Admission Date: 12/14/2023     Discharge Date: 12/16/2023    Primary Discharge Diagnosis  Adenocarcinoma of sigmoid colon (CMS/HCC)    Discharge Disposition  Home   Code Status at Discharge: Prior    Discharge Medications     Medication List      START taking these medications    doxycycline hyclate 100 mg capsule  Commonly known as: VIBRAMYCIN  Take 1 capsule (100 mg total) by mouth 2 (two) times a day for 5 days.  Dose: 100 mg        CONTINUE taking these medications    benzonatate 100 mg capsule  Commonly known as: TESSALON  take 1 capsule by mouth three times a day if needed for cough     fluticasone propionate 50 mcg/actuation nasal spray  Commonly known as: FLONASE  Administer 2 sprays into affected nostril(s) daily.  Dose: 2 spray     levothyroxine 88 mcg tablet  Commonly known as: SYNTHROID  Take 88 mcg by mouth daily.  Dose: 88 mcg     omeprazole 20 mg capsule  Commonly known as: PriLOSEC  Take 20 mg by mouth daily before breakfast.  Dose: 20 mg     propranolol LA 60 mg 24 hr capsule  Commonly known as: INDERAL LA  Take 60 mg by mouth daily.  Dose: 60 mg     simvastatin 20 mg tablet  Commonly known as: ZOCOR  Take 20 mg by mouth nightly.  Dose: 20 mg     TRICOR 48 mg tablet  Take 48 mg by mouth daily.  Dose: 48 mg  Generic drug: fenofibrate            Outpatient Follow-Up  Encounter Information    This patient does not currently have any appointments scheduled.             Test Results Pending at Discharge  Unresulted Labs (From admission, onward)    None          DETAILS OF HOSPITAL STAY    Presenting Problem/History of Present Illness  Malignant neoplasm of sigmoid colon (CMS/HCC) [C18.7]  Adenocarcinoma of sigmoid colon (CMS/HCC) [C18.7]      Operative Procedures Performed  Procedure(s):  XI Robotic  Anterior Resection      Hospital Course  Lc Soliz was admitted to [facility] on 12/14 to undergo a scheduled Xi robotic low anterior resection for T2N0 adenocarcinoma of the sigmoid colon. He tolerated the procedure well and recovered in the PACU before being discharged to the colorectal unit. He tolerated a clear liquid diet and reported good pain control. On POD#1, he continued to recover without incident and was seen by PT/OT, who determined him appropriate for discharge home. He had return of bowel function. On POD#2, he was advanced to a regular diet, which he tolerated. His carroll was removed and he voided independently. At this time he was deemed stable for safe discharge home with assistance. He was discharged with instructions to follow up in the office in two weeks.     Discharge Disposition  Disposition: Home   Destination: Home

## 2023-12-19 LAB
CASE RPRT: NORMAL
CLINICAL INFO: NORMAL
PATH REPORT.FINAL DX SPEC: NORMAL
PATH REPORT.GROSS SPEC: NORMAL
PATHOLOGY SYNOPTIC REPORT: NORMAL

## 2023-12-28 ENCOUNTER — DOCUMENTATION (OUTPATIENT)
Dept: COLORECTAL SURGERY | Facility: CLINIC | Age: 68
End: 2023-12-28
Payer: MEDICARE

## 2023-12-28 NOTE — PROGRESS NOTES
1       2             3              4             5     LAST APPOINTMENT: 01/02/2024     TIME ARRIVED:     TIME ROOMED:     VISIT TYPE:   NP       POV     EPV     DISC:             YES         NO                                   PREPPED:  YES  NO     LABS:  [facility]  [facility]  [facility]  OTHER      PHARMACY ENTERED:  YES   NO     DIAGNOSIS:    Malignant neoplasm of sigmoid colon   SCHEDULED SURGERY        DATE OF SURGERY 12/14/2023: XI Robotic Anterior Resection     PATHOLOGY STAGE: pT2 pN0; (0/38) Lymph Nodes     RADIATION :    Yes   No                                           DOSAGE:      LOCATION:   Anterior   Right Lateral   Left Lateral   Posterior   Circumferential      LEVEL:                        SIZE:     CT SCAN: 11/7/2023  FFC: 11/7/2023    FFS:  11/21/2023  CEA:    PET:    MRI:      OTHER:       FIBER:  NO  METAMUCIL  KONSYL  CITYRUCEL   FIBERCON  BENEFIBER OTHER  LOMOTIL:    NO    1  VS   2    QD    BID    TID    QID  ZANTAC:       Y / N  AMPHOGEL:    Y / N                                                                          START / STOP                                                 RAD ONC:                              N / A        MED ONC:                             N / A

## 2023-12-28 NOTE — ASSESSMENT & PLAN NOTE
Malignant neoplasm of sigmoid colon    Pathology: pT2 pN0; (0/38) Lymph Nodes     12/14/2023: XI Robotic Anterior Resection    Hospital Stay 12/14/2023 - 12/16/2023     CT: 11/7/2023    FFC: 11/7/2023    FFS: 11/21/2023        Pathology: 12/14/2023  A.  Proximal Donut:    Segment of colon with no pathologic change.     B.  Distal Donut:    Segment of colon with no pathologic change.     C.  Sigmoid colon portion of rectum portion of descending colon:    Adenocarcinoma, moderately differentiated, measuring 2.2 cm in longest dimension.  Adenocarcinoma invades submucosa and focal superficial muscularis propria.  Resection margins are free of carcinoma, the nearest resection margin is distal and carcinoma is located 5.1 cm from the distal margin.  Diverticulosis coli.  38 regional lymph nodes with Tattoo pigmentation and an negative for metastatic carcinoma..  Pathologic stage: pT2, pN0       He had a pT2 N0 adenocarcinoma with 0 of 38 lymph nodes.  He does not need any additional therapy next share that with him.  I will see him back here in 3 months time.  All in all he could do better postoperatively.  Liberalize his diet as well as his activities

## 2024-01-02 ENCOUNTER — OFFICE VISIT (OUTPATIENT)
Dept: COLORECTAL SURGERY | Facility: CLINIC | Age: 69
End: 2024-01-02
Payer: MEDICARE

## 2024-01-02 VITALS — BODY MASS INDEX: 28.63 KG/M2 | WEIGHT: 200 LBS | HEIGHT: 70 IN

## 2024-01-02 DIAGNOSIS — C18.7 MALIGNANT NEOPLASM OF SIGMOID COLON (CMS/HCC): Primary | ICD-10-CM

## 2024-01-02 PROCEDURE — 99024 POSTOP FOLLOW-UP VISIT: CPT | Performed by: COLON & RECTAL SURGERY

## 2024-01-02 NOTE — PROGRESS NOTES
"  Patient ID: Lc Soliz                              : 1955  MRN: [id number]                                            Visit Date: 2024  Encounter Provider: Miguel Craig  Referring Provider: No ref. provider found    Subjective   Chief Complaint: Post-op    Lc Soliz is a 68 y.o. old male presenting today for post op evaluation after his Robotic Xi LAR on . Patient has been doing well since his surgery. States he is eating and drinking well, following his low residue diet without issue. States he is having 1-2 formed bowel movements a day. States has been moving around with minimal pain. He inquires on diet liberation including alcohol consumption and when he is due for next colonoscopy.     Medications:   Current Outpatient Medications:   •  benzonatate (TESSALON) 100 mg capsule, take 1 capsule by mouth three times a day if needed for cough, Disp: , Rfl:   •  fluticasone propionate (FLONASE) 50 mcg/actuation nasal spray, Administer 2 sprays into affected nostril(s) daily., Disp: , Rfl:   •  levothyroxine (SYNTHROID) 88 mcg tablet, Take 88 mcg by mouth daily., Disp: , Rfl:   •  omeprazole (PriLOSEC) 20 mg capsule, Take 20 mg by mouth daily before breakfast., Disp: , Rfl:   •  propranolol LA (INDERAL LA) 60 mg 24 hr capsule, Take 60 mg by mouth daily., Disp: , Rfl:   •  simvastatin (ZOCOR) 20 mg tablet, Take 20 mg by mouth nightly., Disp: , Rfl:   •  TRICOR 48 mg tablet, Take 48 mg by mouth daily., Disp: , Rfl:     Past Medical History:  has a past medical history of Arthritis, Disease of thyroid gland, Diverticulitis of colon, Dyslipidemia, Essential tremor, GERD (gastroesophageal reflux disease), Hypothyroidism, NICOLAS on CPAP, and Seasonal allergies.    Objective   Vitals:   Visit Vitals  Ht 1.778 m (5' 10\")   Wt 90.7 kg (200 lb)   BMI 28.70 kg/m²     Physical Exam  Constitutional:       Appearance: Normal appearance.   HENT:      Head: Normocephalic and atraumatic.      Right Ear: " External ear normal.      Left Ear: External ear normal.      Nose: Nose normal.      Mouth/Throat:      Mouth: Mucous membranes are moist.   Cardiovascular:      Rate and Rhythm: Normal rate.      Pulses: Normal pulses.   Pulmonary:      Effort: Pulmonary effort is normal.   Abdominal:      Comments: Soft, nontender, nondistended, incisions clean dry intact and well healed   Musculoskeletal:         General: Normal range of motion.   Skin:     General: Skin is warm.   Neurological:      General: No focal deficit present.      Mental Status: He is alert.   Psychiatric:         Mood and Affect: Mood normal.            Assessment/Plan   Problem List Items Addressed This Visit        Digestive    Malignant neoplasm of sigmoid colon (CMS/HCC) - Primary     Malignant neoplasm of sigmoid colon    Pathology: pT2 pN0; (0/38) Lymph Nodes     12/14/2023: XI Robotic Anterior Resection    Hospital Stay 12/14/2023 - 12/16/2023     CT: 11/7/2023    FFC: 11/7/2023    FFS: 11/21/2023        Pathology: 12/14/2023  A.  Proximal Donut:    Segment of colon with no pathologic change.     B.  Distal Donut:    Segment of colon with no pathologic change.     C.  Sigmoid colon portion of rectum portion of descending colon:    Adenocarcinoma, moderately differentiated, measuring 2.2 cm in longest dimension.  Adenocarcinoma invades submucosa and focal superficial muscularis propria.  Resection margins are free of carcinoma, the nearest resection margin is distal and carcinoma is located 5.1 cm from the distal margin.  Diverticulosis coli.  38 regional lymph nodes with Tattoo pigmentation and an negative for metastatic carcinoma..  Pathologic stage: pT2, pN0     11/21/2023: He is a cancer of the distal sigmoid colon.  There is a tattoo immediately distal to this.  The plan will be for a low anterior resection anastomosis could be above the rectovesicular fold.  There is no sign of any metastatic disease we will look at his CT scan there is a  lot of edema of the sigmoid colon which looks like previous diverticulitis. The tumor itself did not look too sizable or advanced endoscopically or radiographically.  I speak with him about the risk of bleed infection abscess incontinence anastomotic leak bladder and sexual function even death.  He understands wished to proceed we will make arrangements for this. We will have him obtain medical clearance. We will plan for anterior resection          ***  No follow-ups on file.       Ysabel Olivera, DO

## 2024-01-02 NOTE — PROGRESS NOTES
Subjective:    He is eating well move his bowels without difficulty    Objective:    Incisions are well-healed Steri-Strips removed    Assessment/Plan   Malignant neoplasm of sigmoid colon (CMS/HCC)  Malignant neoplasm of sigmoid colon    Pathology: pT2 pN0; (0/38) Lymph Nodes     12/14/2023: XI Robotic Anterior Resection    Hospital Stay 12/14/2023 - 12/16/2023     CT: 11/7/2023    FFC: 11/7/2023    FFS: 11/21/2023        Pathology: 12/14/2023  A.  Proximal Donut:    Segment of colon with no pathologic change.     B.  Distal Donut:    Segment of colon with no pathologic change.     C.  Sigmoid colon portion of rectum portion of descending colon:    Adenocarcinoma, moderately differentiated, measuring 2.2 cm in longest dimension.  Adenocarcinoma invades submucosa and focal superficial muscularis propria.  Resection margins are free of carcinoma, the nearest resection margin is distal and carcinoma is located 5.1 cm from the distal margin.  Diverticulosis coli.  38 regional lymph nodes with Tattoo pigmentation and an negative for metastatic carcinoma..  Pathologic stage: pT2, pN0       He had a pT2 N0 adenocarcinoma with 0 of 38 lymph nodes.  He does not need any additional therapy next share that with him.  I will see him back here in 3 months time.  All in all he could do better postoperatively.  Liberalize his diet as well as his activities        Miguel Craig MD           This note was dictated by Dr. Miguel Craig, but not proofread. We apologize for any typographical errors due to dictation software.

## 2024-04-01 ENCOUNTER — OFFICE VISIT (OUTPATIENT)
Dept: COLORECTAL SURGERY | Facility: CLINIC | Age: 69
End: 2024-04-01
Payer: MEDICARE

## 2024-04-01 ENCOUNTER — APPOINTMENT (OUTPATIENT)
Dept: LAB | Facility: HOSPITAL | Age: 69
End: 2024-04-01
Payer: MEDICARE

## 2024-04-01 VITALS — BODY MASS INDEX: 32.21 KG/M2 | WEIGHT: 225 LBS | HEIGHT: 70 IN

## 2024-04-01 DIAGNOSIS — C18.7 MALIGNANT NEOPLASM OF SIGMOID COLON (CMS/HCC): Primary | ICD-10-CM

## 2024-04-01 DIAGNOSIS — C18.7 MALIGNANT NEOPLASM OF SIGMOID COLON (CMS/HCC): ICD-10-CM

## 2024-04-01 LAB — CEA SERPL-MCNC: 0.8 NG/ML

## 2024-04-01 PROCEDURE — 36415 COLL VENOUS BLD VENIPUNCTURE: CPT

## 2024-04-01 PROCEDURE — 99214 OFFICE O/P EST MOD 30 MIN: CPT

## 2024-04-01 PROCEDURE — 82378 CARCINOEMBRYONIC ANTIGEN: CPT

## 2024-04-01 NOTE — ASSESSMENT & PLAN NOTE
Sancho is doing very well.  He does report sometimes he feels a sensation of incomplete emptying where he will often go back to the bathroom a second time to alleviate his bowels.  He is not seeing any rectal bleeding or having rectal pain.  We discussed starting a daily powdered fiber supplement, such as Metamucil.  He will do 1 teaspoon in 8 ounces of water every morning.  He will prep with 2 Fleet enemas prior to his next visit in 3 months time.  A CEA was also ordered for him at this time.

## 2024-04-01 NOTE — PROGRESS NOTES
Patient ID: Lc Soliz                              : 1955  MRN: [id number]                                           Visit Date: 2024  Encounter Provider: Yasmin Bernal  Referring Provider: Ron Baig MD    Subjective:    HPI: Sancho is now 4 months post X I robotic anterior resection for a pT2 N0 sigmoid colon cancer.  He is doing very well.  He typically moves his bowels about 1-2 times per day without any rectal bleeding.  He gets a slight rectal discomfort depending on the frequency of stools.  He denies any abdominal pain, nausea, vomiting, fever or chills.  Energy and appetite are fully normal.    Medications:   Current Outpatient Medications:     benzonatate (TESSALON) 100 mg capsule, take 1 capsule by mouth three times a day if needed for cough, Disp: , Rfl:     fluticasone propionate (FLONASE) 50 mcg/actuation nasal spray, Administer 2 sprays into affected nostril(s) daily., Disp: , Rfl:     levothyroxine (SYNTHROID) 88 mcg tablet, Take 88 mcg by mouth daily., Disp: , Rfl:     omeprazole (PriLOSEC) 20 mg capsule, Take 20 mg by mouth daily before breakfast., Disp: , Rfl:     propranolol LA (INDERAL LA) 60 mg 24 hr capsule, Take 60 mg by mouth daily., Disp: , Rfl:     simvastatin (ZOCOR) 20 mg tablet, Take 20 mg by mouth nightly., Disp: , Rfl:     TRICOR 48 mg tablet, Take 48 mg by mouth daily., Disp: , Rfl:     Past Medical History:  has a past medical history of Arthritis, Disease of thyroid gland, Diverticulitis of colon, Dyslipidemia, Essential tremor, GERD (gastroesophageal reflux disease), Hypothyroidism, NICOLAS on CPAP, and Seasonal allergies.  Past Surgical History:  has a past surgical history that includes Knee surgery (Left) and Colonoscopy.  Social History:   Social History     Tobacco Use    Smoking status: Former     Packs/day: 1.00     Years: 20.00     Additional pack years: 0.00     Total pack years: 20.00     Types: Cigarettes     Quit date:      Years since quitting:  "31.2    Smokeless tobacco: Never   Substance Use Topics    Alcohol use: Yes     Alcohol/week: 1.0 standard drink of alcohol     Types: 1 Glasses of wine per week    Drug use: Never     Family History:   Family History   Problem Relation Age of Onset    Stroke Biological Father     Diabetes Biological Father     Prostate cancer Biological Brother     Stomach cancer Maternal Grandmother      Allergies: has No Known Allergies.     Review of Systems   Constitutional: Negative for activity change.   HENT: Negative for congestion.    Respiratory: Negative for apnea, chest tightness and wheezing.    Cardiovascular: Negative for chest pain, palpitations and leg swelling.   Gastrointestinal: Negative for nausea.   Endocrine: Negative for polyuria.   Skin: Negative for color change.   Allergic/Immunologic: Negative for immunocompromised state.   Neurological: Negative for dizziness.   Hematological: Negative for adenopathy.   The following have been reviewed and updated as appropriate in this visit:   Allergies  Meds  Problems         Objective:  Vitals: Visit Vitals  Ht 1.778 m (5' 10\")   Wt 102 kg (225 lb)   BMI 32.28 kg/m²     Constitutional: He is oriented to person, place, and time. He appears well-developed and well-nourished.   HENT:   Head: Normocephalic and atraumatic.   Eyes: Conjunctivae are normal.   Neck: Normal range of motion.   Cardiovascular: Normal rate.    Pulmonary/Chest: Effort normal.   Musculoskeletal: Normal range of motion. He exhibits no edema.   Neurological: He is alert and oriented to person, place, and time.   Skin: Skin is warm and dry.   Psychiatric: He has a normal mood and affect.   Nursing note and vitals reviewed.      Abdominal Exam: Soft, nontender and nondistended.      Rectal Exam: Normal sphincter tone, no palpable masses or abnormalities.      ASSESSMENT/PLAN:  Malignant neoplasm of sigmoid colon (CMS/HCC)  Sancho is doing very well.  He does report sometimes he feels a sensation of " incomplete emptying where he will often go back to the bathroom a second time to alleviate his bowels.  He is not seeing any rectal bleeding or having rectal pain.  We discussed starting a daily powdered fiber supplement, such as Metamucil.  He will do 1 teaspoon in 8 ounces of water every morning.  He will prep with 2 Fleet enemas prior to his next visit in 3 months time.  A CEA was also ordered for him at this time.        GERRI Wilburn    This note was dictated by Dr. Miguel Craig, but not proofread. We apologize for any typographical errors due to dictation software.

## 2024-06-21 ENCOUNTER — TELEPHONE (OUTPATIENT)
Dept: COLORECTAL SURGERY | Facility: CLINIC | Age: 69
End: 2024-06-21
Payer: MEDICARE

## 2024-08-01 NOTE — ASSESSMENT & PLAN NOTE
Malignant neoplasm of sigmoid colon    Pathology: pT2 pN0; (0/38) Lymph Nodes     12/14/2023: XI Robotic Anterior Resection    Hospital Stay 12/14/2023 - 12/16/2023     CT: 11/7/2023    FFC: 11/7/2023    FFS: 11/21/2023    CEA: 0.8 (04/01/2024)    He is doing great really has no complaints.  Bowels working well he is due for colonoscopy in 3 months and a CT of the chest and pelvis in 3 months we will see him back in the office in 6 months time.  Make arrangements for his colonoscopy.

## 2024-08-02 ENCOUNTER — DOCUMENTATION (OUTPATIENT)
Dept: COLORECTAL SURGERY | Facility: CLINIC | Age: 69
End: 2024-08-02
Payer: MEDICARE

## 2024-08-02 NOTE — PROGRESS NOTES
1       2             3              4             5     LAST APPOINTMENT: 04/01/2024     TIME ARRIVED:     TIME ROOMED:     VISIT TYPE:   NP       POV     EPV     DISC:             YES         NO                                   PREPPED:  YES  NO     LABS:  [facility]  [facility]  [facility]  OTHER      PHARMACY ENTERED:  YES   NO     DIAGNOSIS:  Malignant neoplasm of sigmoid colon   Pathology: pT2 pN0; (0/38) Lymph Nodes     SCHEDULED SURGERY        DATE OF SURGERY  12/14/2023: XI Robotic Anterior Resection           CT SCAN: 11/7/2023   FFC:  11/7/2023   FFS:  11/21/2023   CEA:  0.8 (04/01/2024)   PET:    MRI:      OTHER:       FIBER:  NO  METAMUCIL  KONSYL  CITYRUCEL   FIBERCON  BENEFIBER OTHER  LOMOTIL:    NO    1  VS   2    QD    BID    TID    QID  ZANTAC:       Y / N  AMPHOGEL:    Y / N                                                                          START / STOP                                                 RAD ONC:                              N / A        MED ONC:                             N / A

## 2024-08-06 ENCOUNTER — OFFICE VISIT (OUTPATIENT)
Dept: COLORECTAL SURGERY | Facility: CLINIC | Age: 69
End: 2024-08-06
Payer: MEDICARE

## 2024-08-06 VITALS — HEIGHT: 70 IN | WEIGHT: 225 LBS | BODY MASS INDEX: 32.21 KG/M2

## 2024-08-06 DIAGNOSIS — C18.7 MALIGNANT NEOPLASM OF SIGMOID COLON (CMS/HCC): Primary | ICD-10-CM

## 2024-08-06 PROCEDURE — 45330 DIAGNOSTIC SIGMOIDOSCOPY: CPT | Performed by: COLON & RECTAL SURGERY

## 2024-08-06 PROCEDURE — 99213 OFFICE O/P EST LOW 20 MIN: CPT | Mod: 25 | Performed by: COLON & RECTAL SURGERY

## 2024-08-06 NOTE — PROGRESS NOTES
Patient ID: Lc Soliz                              : 1955  MRN: [id number]                                           Visit Date: 2024  Encounter Provider: Miguel Craig  Referring Provider: No ref. provider found    Subjective:     HPI: He is eating well moving his bowels without difficulty has no complaints to speak of      Medications:   Current Outpatient Medications:     levothyroxine (SYNTHROID) 88 mcg tablet, Take 88 mcg by mouth daily., Disp: , Rfl:     omeprazole (PriLOSEC) 20 mg capsule, Take 20 mg by mouth daily before breakfast., Disp: , Rfl:     propranolol LA (INDERAL LA) 60 mg 24 hr capsule, Take 60 mg by mouth daily., Disp: , Rfl:     simvastatin (ZOCOR) 20 mg tablet, Take 20 mg by mouth nightly., Disp: , Rfl:     TRICOR 48 mg tablet, Take 48 mg by mouth daily., Disp: , Rfl:     benzonatate (TESSALON) 100 mg capsule, take 1 capsule by mouth three times a day if needed for cough, Disp: , Rfl:     fluticasone propionate (FLONASE) 50 mcg/actuation nasal spray, Administer 2 sprays into affected nostril(s) daily., Disp: , Rfl:     Past Medical History:  has a past medical history of Arthritis, Disease of thyroid gland, Diverticulitis of colon, Dyslipidemia, Essential tremor, GERD (gastroesophageal reflux disease), Hypothyroidism, NICOLAS on CPAP, and Seasonal allergies.  Past Surgical History:  has a past surgical history that includes Knee surgery (Left) and Colonoscopy.  Social History:   Social History     Tobacco Use    Smoking status: Former     Packs/day: 1.00     Years: 20.00     Additional pack years: 0.00     Total pack years: 20.00     Types: Cigarettes     Quit date:      Years since quittin.6    Smokeless tobacco: Never   Substance Use Topics    Alcohol use: Yes     Alcohol/week: 1.0 standard drink of alcohol     Types: 1 Glasses of wine per week    Drug use: Never     Family History:   Family History   Problem Relation Age of Onset    Stroke Biological Father     Diabetes  "Biological Father     Prostate cancer Biological Brother     Stomach cancer Maternal Grandmother      Allergies: has No Known Allergies.     Review of Systems   Constitutional: Negative for activity change.   HENT: Negative for congestion.    Respiratory: Negative for apnea, chest tightness and wheezing.    Cardiovascular: Negative for chest pain, palpitations and leg swelling.   Gastrointestinal: Negative for nausea.   Endocrine: Negative for polyuria.   Skin: Negative for color change.   Allergic/Immunologic: Negative for immunocompromised state.   Neurological: Negative for dizziness.   Hematological: Negative for adenopathy.   The following have been reviewed and updated as appropriate in this visit:   Allergies  Meds  Problems         Objective:  Vitals: Visit Vitals  Ht 1.778 m (5' 10\")   Wt 102 kg (225 lb)   BMI 32.28 kg/m²     Constitutional: He is oriented to person, place, and time. He appears well-developed and well-nourished.   HENT:   Head: Normocephalic and atraumatic.   Eyes: Conjunctivae are normal.   Neck: Normal range of motion.   Cardiovascular: Normal rate.    Pulmonary/Chest: Effort normal.   Musculoskeletal: Normal range of motion. He exhibits no edema.   Neurological: He is alert and oriented to person, place, and time.   Skin: Skin is warm and dry.   Psychiatric: He has a normal mood and affect.   Nursing note and vitals reviewed.      Abdominal Exam: Soft nondistended nontender      Rectal Exam: Normal tone sigmoidoscopy evaluation carried out through the rectum into the descending colon anastomosis seen formed stools met aspects of small amount of granulation tissue evidence of recurrent disease  The few days somewhat occluded from imperfect prep    ASSESSMENT/PLAN:  Malignant neoplasm of sigmoid colon (CMS/HCC)  Malignant neoplasm of sigmoid colon    Pathology: pT2 pN0; (0/38) Lymph Nodes     12/14/2023: XI Robotic Anterior Resection    Hospital Stay 12/14/2023 - 12/16/2023     CT: " 11/7/2023    FFC: 11/7/2023    FFS: 11/21/2023    CEA: 0.8 (04/01/2024)    He is doing great really has no complaints.  Bowels working well he is due for colonoscopy in 3 months and a CT of the chest and pelvis in 3 months we will see him back in the office in 6 months time.  Make arrangements for his colonoscopy.      Miguel Craig MD    This note was dictated by Dr. Miguel Craig, but not proofread. We apologize for any typographical errors due to dictation software.

## 2024-10-28 ENCOUNTER — TELEPHONE (OUTPATIENT)
Dept: COLORECTAL SURGERY | Facility: CLINIC | Age: 69
End: 2024-10-28
Payer: MEDICARE

## 2024-10-28 NOTE — TELEPHONE ENCOUNTER
Returned call to patient.  Fasting is not required for BUN and creatinine blood draw.  Patient appreciative of call.

## 2024-10-30 LAB
CREAT SERPL-MCNC: 1 MG/DL (ref 0.76–1.27)
EGFRCR SERPLBLD CKD-EPI 2021: 81 ML/MIN/1.73

## 2024-12-09 NOTE — H&P
General Surgery History and Physical    Diagnosis: Encounter for screening colonoscopy [Z12.11].    HPI     Patient is a 69 y.o. male who presents for surveillance colonoscopy. History of Xi robotic LAR for T2N0 sigmoid cancer 23.  CEA 24 was 0.8    Medical History:   Past Medical History:   Diagnosis Date    Arthritis     Disease of thyroid gland     Diverticulitis of colon     Dyslipidemia     Essential tremor     GERD (gastroesophageal reflux disease)     Hypothyroidism     NICOLAS on CPAP     Seasonal allergies        Surgical History:   Past Surgical History   Procedure Laterality Date    Colonoscopy      Knee surgery Left     XI Robotic Anterior Resection N/A 2023    Performed by Miguel Craig MD at [facility]       Social History:   Social History     Socioeconomic History    Marital status:    Tobacco Use    Smoking status: Former     Current packs/day: 0.00     Average packs/day: 1 pack/day for 20.0 years (20.0 ttl pk-yrs)     Types: Cigarettes     Start date:      Quit date:      Years since quittin.9    Smokeless tobacco: Never   Substance and Sexual Activity    Alcohol use: Yes     Alcohol/week: 1.0 standard drink of alcohol     Types: 1 Glasses of wine per week    Drug use: Never    Sexual activity: Defer   Social History Narrative    Lives with wife, one story home     Social Drivers of Health     Housing Stability: Unknown (12/15/2023)    Housing Stability Vital Sign     Unable to Pay for Housing in the Last Year: No     Unstable Housing in the Last Year: No       Family History:   Family History   Problem Relation Name Age of Onset    Stroke Biological Father      Diabetes Biological Father      Prostate cancer Biological Brother      Stomach cancer Maternal Grandmother         Allergies: Patient has no known allergies.    Home Medications:      Current Medications:  No current facility-administered medications for this encounter.     Current Outpatient Medications    Medication Sig Dispense Refill    benzonatate (TESSALON) 100 mg capsule take 1 capsule by mouth three times a day if needed for cough      fluticasone propionate (FLONASE) 50 mcg/actuation nasal spray Administer 2 sprays into affected nostril(s) daily.      levothyroxine (SYNTHROID) 88 mcg tablet Take 88 mcg by mouth daily.      omeprazole (PriLOSEC) 20 mg capsule Take 20 mg by mouth daily before breakfast.      propranolol LA (INDERAL LA) 60 mg 24 hr capsule Take 60 mg by mouth daily.      simvastatin (ZOCOR) 20 mg tablet Take 20 mg by mouth nightly.      TRICOR 48 mg tablet Take 48 mg by mouth daily.         Review of Systems  Pertinent items are noted in HPI.    Objective     Vital Signs for the last 24 hours:  BP: ()/()   Arterial Line BP: ()/()     Physicial Exam    General appearance: alert, appears stated age, and cooperative  Head: normocephalic, without obvious abnormality, atraumatic  Eyes: conjunctivae/corneas clear. PERRL, EOM's intact. Fundi benign.  Ears: normal TM's and external ear canals both ears  Nose: Nares normal. Septum midline. Mucosa normal. No drainage or sinus tenderness.  Throat: lips, mucosa, and tongue normal; teeth and gums normal  Neck: no adenopathy, no carotid bruit, no JVD, supple, symmetrical, trachea midline, and thyroid not enlarged, symmetric, no tenderness/mass/nodules  Back: symmetric, no curvature. ROM normal. No CVA tenderness.  Lungs: clear to auscultation bilaterally  Chest wall: no tenderness  Heart: regular rate and rhythm, S1, S2 normal, no murmur, click, rub or gallop  Abdomen: soft, non-tender; bowel sounds normal; no masses, no organomegaly  Extremities: extremities normal, warm and well-perfused; no cyanosis, clubbing, or edema  Pulses: 2+ and symmetric  Skin: Skin color, texture, turgor normal. No rashes or lesions  Lymph nodes: Cervical, supraclavicular, and axillary nodes normal.  Neurologic: Grossly normal      VTE Assessment: Caprini      Assessment/Plan      History of colon cancer; colonoscopy

## 2024-12-11 ENCOUNTER — ANESTHESIA EVENT (OUTPATIENT)
Dept: ENDOSCOPY | Facility: HOSPITAL | Age: 69
End: 2024-12-11
Payer: MEDICARE

## 2024-12-11 ENCOUNTER — ANESTHESIA (OUTPATIENT)
Dept: ENDOSCOPY | Facility: HOSPITAL | Age: 69
End: 2024-12-11
Payer: MEDICARE

## 2024-12-11 ENCOUNTER — HOSPITAL ENCOUNTER (OUTPATIENT)
Facility: HOSPITAL | Age: 69
Discharge: HOME | End: 2024-12-11
Attending: COLON & RECTAL SURGERY | Admitting: COLON & RECTAL SURGERY
Payer: MEDICARE

## 2024-12-11 VITALS
RESPIRATION RATE: 25 BRPM | TEMPERATURE: 96.9 F | WEIGHT: 225 LBS | OXYGEN SATURATION: 97 % | DIASTOLIC BLOOD PRESSURE: 91 MMHG | HEART RATE: 81 BPM | BODY MASS INDEX: 32.21 KG/M2 | HEIGHT: 70 IN | SYSTOLIC BLOOD PRESSURE: 139 MMHG

## 2024-12-11 PROCEDURE — 0DJD8ZZ INSPECTION OF LOWER INTESTINAL TRACT, VIA NATURAL OR ARTIFICIAL OPENING ENDOSCOPIC: ICD-10-PCS | Performed by: COLON & RECTAL SURGERY

## 2024-12-11 PROCEDURE — 37000002 HC ANESTHESIA MAC: Performed by: COLON & RECTAL SURGERY

## 2024-12-11 PROCEDURE — 75000014 HC COLONSCOPY DIAGNOSTIC: Performed by: COLON & RECTAL SURGERY

## 2024-12-11 PROCEDURE — 45378 DIAGNOSTIC COLONOSCOPY: CPT | Performed by: COLON & RECTAL SURGERY

## 2024-12-11 PROCEDURE — 25000000 HC PHARMACY GENERAL: Performed by: NURSE ANESTHETIST, CERTIFIED REGISTERED

## 2024-12-11 PROCEDURE — 71000001 HC PACU PHASE 1 INITIAL 30MIN: Performed by: COLON & RECTAL SURGERY

## 2024-12-11 PROCEDURE — 71000011 HC PACU PHASE 1 EA ADDL MIN: Performed by: COLON & RECTAL SURGERY

## 2024-12-11 PROCEDURE — 63600000 HC DRUGS/DETAIL CODE: Mod: JW | Performed by: NURSE ANESTHETIST, CERTIFIED REGISTERED

## 2024-12-11 PROCEDURE — 25800000 HC PHARMACY IV SOLUTIONS: Performed by: NURSE ANESTHETIST, CERTIFIED REGISTERED

## 2024-12-11 RX ORDER — SODIUM CHLORIDE 9 MG/ML
INJECTION, SOLUTION INTRAVENOUS CONTINUOUS PRN
Status: DISCONTINUED | OUTPATIENT
Start: 2024-12-11 | End: 2024-12-11 | Stop reason: SURG

## 2024-12-11 RX ORDER — DEXTROSE 50 % IN WATER (D50W) INTRAVENOUS SYRINGE
25 AS NEEDED
Status: CANCELLED | OUTPATIENT
Start: 2024-12-11

## 2024-12-11 RX ORDER — PROPOFOL 200MG/20ML
SYRINGE (ML) INTRAVENOUS AS NEEDED
Status: DISCONTINUED | OUTPATIENT
Start: 2024-12-11 | End: 2024-12-11 | Stop reason: SURG

## 2024-12-11 RX ORDER — LIDOCAINE HYDROCHLORIDE 10 MG/ML
INJECTION, SOLUTION EPIDURAL; INFILTRATION; INTRACAUDAL; PERINEURAL AS NEEDED
Status: DISCONTINUED | OUTPATIENT
Start: 2024-12-11 | End: 2024-12-11 | Stop reason: SURG

## 2024-12-11 RX ORDER — IBUPROFEN 200 MG
16-32 TABLET ORAL AS NEEDED
Status: CANCELLED | OUTPATIENT
Start: 2024-12-11

## 2024-12-11 RX ORDER — DEXTROSE 40 %
15-30 GEL (GRAM) ORAL AS NEEDED
Status: CANCELLED | OUTPATIENT
Start: 2024-12-11

## 2024-12-11 RX ORDER — PROPOFOL 10 MG/ML
INJECTION, EMULSION INTRAVENOUS CONTINUOUS PRN
Status: DISCONTINUED | OUTPATIENT
Start: 2024-12-11 | End: 2024-12-11 | Stop reason: SURG

## 2024-12-11 RX ADMIN — LIDOCAINE HYDROCHLORIDE 10 ML: 10 INJECTION, SOLUTION EPIDURAL; INFILTRATION; INTRACAUDAL; PERINEURAL at 15:46

## 2024-12-11 RX ADMIN — PROPOFOL 50 MG: 10 INJECTION, EMULSION INTRAVENOUS at 15:46

## 2024-12-11 RX ADMIN — SODIUM CHLORIDE: 9 INJECTION, SOLUTION INTRAVENOUS at 15:42

## 2024-12-11 RX ADMIN — PROPOFOL 150 MCG/KG/MIN: 10 INJECTION, EMULSION INTRAVENOUS at 15:47

## 2024-12-11 ASSESSMENT — PAIN SCALES - GENERAL: PAIN_LEVEL: 0

## 2024-12-11 ASSESSMENT — ENCOUNTER SYMPTOMS: SHORTNESS OF BREATH: 1

## 2024-12-11 NOTE — ANESTHESIA POSTPROCEDURE EVALUATION
Patient: Lc Soliz    Procedure Summary       Date: 12/11/24 Room / Location: [facility] 4 (D) / [facility]    Anesthesia Start: 1542 Anesthesia Stop: 1610    Procedure: DIAGNOSTIC FLEXIBLE COLONOSCOPY PROXIMAL TO SPLENIC FLEXURE WITH COLLECTION OF SPECIMEN BY WASHING AND COLON DECOMPRESSION (Anus) Diagnosis:       Encounter for screening colonoscopy      (Colonoscopy)    Providers: Miguel Craig MD Responsible Provider: Mane Gregorio MD    Anesthesia Type: MAC ASA Status: 2            Anesthesia Type: MAC  PACU Vitals  12/11/2024 1606 - 12/11/2024 1611        12/11/2024  1608             BP: 95/61    Pulse: 92    Resp: 21    SpO2: 93 %              Anesthesia Post Evaluation    Pain score: 0  Pain management: satisfactory to patient  Mode of pain management: IV medication  Patient location during evaluation: PACU  Patient participation: complete - patient participated  Level of consciousness: awake and alert  Cardiovascular status: acceptable  Airway Patency: adequate  Respiratory status: acceptable  Hydration status: stable  Anesthetic complications: no

## 2024-12-11 NOTE — ANESTHESIOLOGIST PRE-PROCEDURE ATTESTATION
Pre-Procedure Patient Identification:  I am the Primary Anesthesiologist and have identified the patient on 12/11/24 at 3:29 PM.   I have confirmed the procedure(s) will be performed by the following surgeon/proceduralist Miguel Craig MD.

## 2024-12-11 NOTE — ANESTHESIA PREPROCEDURE EVALUATION
Relevant Problems   GASTROINTESTINAL   (+) GERD (gastroesophageal reflux disease)      HEMATOLOGY   (+) Anemia   (+) Microcytic hypochromic anemia      MUSCULOSKELETAL   (+) Arthritis   (+) Osteoarthritis      RESPIRATORY SYSTEM   (+) Obstructive sleep apnea (adult) (pediatric)   (+) Obstructive sleep apnea on CPAP   (+) Sleep apnea, unspecified      Other   (+) Adenocarcinoma of sigmoid colon (CMS/HCC)   (+) Hypothyroidism   (+) Malignant neoplasm of sigmoid colon (CMS/HCC)       Anesthesia ROS/MED HX      Pulmonary    Shortness of breath   Sleep apnea  GI/Hepatic   GERD       Past Surgical History   Procedure Laterality Date    Colonoscopy      Knee surgery Left     XI Robotic Anterior Resection N/A 12/14/2023    Performed by Miguel Craig MD at [facility] OR       Physical Exam    Airway   Mallampati: II   TM distance: >3 FB   Neck ROM: full  Cardiovascular - normal   Rhythm: regular   Rate: normalPulmonary - normal   clear to auscultation  Dental - normal        Anesthesia Plan    Plan: MAC    Technique: MAC     Lines and Monitors: PIV     Airway: natural airway / supplemental oxygen    2 ASA  Anesthetic plan and risks discussed with: patient  Induction:    intravenous   Postop Plan:   Patient Disposition: phase II then home   Pain Management: IV analgesics

## 2024-12-11 NOTE — OP NOTE
_______________________________________________________________________________  Patient Name: Lc Soliz           Procedure Date: 12/11/2024 3:39 PM  MRN: [id number]                     Account Number: [id number]  YOB: 1955               Age: 69  Gender: Male                          Note Status: Finalized  Attending MD: JO JARRELL MD,  _______________________________________________________________________________  Procedure:             Colonoscopy  Indications:           Follow-up of colon cancer  Providers:             JO JARRELL MD (Doctor)  Referring MD:          LUIS FELIPE CAMARA MD  Requesting Provider:  Medicines:             Monitored Anesthesia Care  Complications:         No immediate complications.  _______________________________________________________________________________  Procedure:             After I obtained informed consent, the scope was  passed under direct vision. Throughout the procedure,  the patient's blood pressure, pulse, and oxygen  saturations were monitored continuously. The  colonoscope was introduced through the anus and  advanced to the cecum, identified by appendiceal  orifice and ileocecal valve. The colonoscopy was  performed without difficulty. The patient tolerated  the procedure well. The quality of the bowel  preparation was good.  Findings:  Anastomosis Normal without evidence of recurrence  Scattered diverticula were found in the descending colon.  The exam was otherwise normal throughout the examined colon.  Impression:            - Diverticulosis in the descending colon.  - No specimens collected.  Recommendation:        - Discharge patient to home.  Procedure Code(s):     --- Professional ---  98217, Colonoscopy, flexible; diagnostic, including  collection of specimen(s) by brushing or washing, when  performed (separate procedure)  Diagnosis Code(s):     --- Professional ---  C18.9, Malignant neoplasm of colon, unspecified  K57.30,  Diverticulosis of large intestine without  perforation or abscess without bleeding  CPT copyright 2023 [employer]. All rights reserved.  The codes documented in this report are preliminary and upon  review may  be revised to meet current compliance requirements.  Attending Participation:  I was present and participated during the entire procedure, including  non-sesay portions.  Jo Jarrell MD  ________________  JO JARRELL MD  12/11/2024 4:04:57 PM  This report has been signed electronically.  Number of Addenda: 0  Note Initiated On: 12/11/2024 3:39 PM

## 2024-12-18 ENCOUNTER — TELEPHONE (OUTPATIENT)
Dept: COLORECTAL SURGERY | Facility: CLINIC | Age: 69
End: 2024-12-18
Payer: MEDICARE

## 2025-06-10 ENCOUNTER — OFFICE VISIT (OUTPATIENT)
Dept: COLORECTAL SURGERY | Facility: CLINIC | Age: 70
End: 2025-06-10
Payer: MEDICARE

## 2025-06-10 ENCOUNTER — APPOINTMENT (OUTPATIENT)
Dept: LAB | Facility: HOSPITAL | Age: 70
End: 2025-06-10
Attending: COLON & RECTAL SURGERY
Payer: MEDICARE

## 2025-06-10 VITALS — WEIGHT: 225 LBS | HEIGHT: 70 IN | BODY MASS INDEX: 32.21 KG/M2

## 2025-06-10 DIAGNOSIS — Z12.5 SCREENING PSA (PROSTATE SPECIFIC ANTIGEN): ICD-10-CM

## 2025-06-10 DIAGNOSIS — Z80.42 FAMILY HX OF PROSTATE CANCER: ICD-10-CM

## 2025-06-10 DIAGNOSIS — C18.7 MALIGNANT NEOPLASM OF SIGMOID COLON (CMS/HCC): Primary | ICD-10-CM

## 2025-06-10 DIAGNOSIS — C18.7 MALIGNANT NEOPLASM OF SIGMOID COLON (CMS/HCC): ICD-10-CM

## 2025-06-10 LAB
BASOPHILS # BLD: 0.05 K/UL (ref 0.01–0.1)
BASOPHILS NFR BLD: 0.6 %
BUN SERPL-MCNC: 11 MG/DL (ref 7–25)
CEA SERPL-MCNC: 0.7 NG/ML
CREAT SERPL-MCNC: 1 MG/DL (ref 0.7–1.3)
DIFFERENTIAL METHOD BLD: ABNORMAL
EGFRCR SERPLBLD CKD-EPI 2021: >60 ML/MIN/1.73M*2
EOSINOPHIL # BLD: 0.38 K/UL (ref 0.04–0.54)
EOSINOPHIL NFR BLD: 4.7 %
ERYTHROCYTE [DISTWIDTH] IN BLOOD BY AUTOMATED COUNT: 15.3 % (ref 11.6–14.4)
HCT VFR BLD AUTO: 45.7 % (ref 40.1–51)
HGB BLD-MCNC: 14.8 G/DL (ref 13.7–17.5)
IMM GRANULOCYTES # BLD AUTO: 0.03 K/UL (ref 0–0.08)
IMM GRANULOCYTES NFR BLD AUTO: 0.4 %
LYMPHOCYTES # BLD: 1.61 K/UL (ref 1.2–3.5)
LYMPHOCYTES NFR BLD: 20 %
MCH RBC QN AUTO: 27.5 PG (ref 28–33.2)
MCHC RBC AUTO-ENTMCNC: 32.4 G/DL (ref 32.2–36.5)
MCV RBC AUTO: 84.8 FL (ref 83–98)
MONOCYTES # BLD: 0.7 K/UL (ref 0.3–1)
MONOCYTES NFR BLD: 8.7 %
NEUTROPHILS # BLD: 5.27 K/UL (ref 1.7–7)
NEUTS SEG NFR BLD: 65.6 %
NRBC BLD-RTO: 0 %
PLATELET # BLD AUTO: 227 K/UL (ref 150–350)
PMV BLD AUTO: 10.7 FL (ref 9.4–12.4)
PSA SERPL-MCNC: 5.37 NG/ML
RBC # BLD AUTO: 5.39 M/UL (ref 4.5–5.8)
WBC # BLD AUTO: 8.04 K/UL (ref 3.8–10.5)

## 2025-06-10 PROCEDURE — 36415 COLL VENOUS BLD VENIPUNCTURE: CPT

## 2025-06-10 PROCEDURE — 84153 ASSAY OF PSA TOTAL: CPT

## 2025-06-10 PROCEDURE — 82565 ASSAY OF CREATININE: CPT

## 2025-06-10 PROCEDURE — 45330 DIAGNOSTIC SIGMOIDOSCOPY: CPT | Performed by: COLON & RECTAL SURGERY

## 2025-06-10 PROCEDURE — 85025 COMPLETE CBC W/AUTO DIFF WBC: CPT

## 2025-06-10 PROCEDURE — 99213 OFFICE O/P EST LOW 20 MIN: CPT | Mod: 25 | Performed by: COLON & RECTAL SURGERY

## 2025-06-10 PROCEDURE — 82378 CARCINOEMBRYONIC ANTIGEN: CPT

## 2025-06-10 PROCEDURE — 84520 ASSAY OF UREA NITROGEN: CPT

## 2025-06-10 NOTE — PROGRESS NOTES
Please patient ID: [id number] Soliz                              : 1955  MRN: [id number]                                           Visit Date: 6/10/2025  Encounter Provider: Miguel Craig  Referring Provider: No ref. provider found    Subjective:    HPI: Sancho has a history of an XI LAR for a pT2N0 sigmoid cancer in 2023. He had his colonoscopy in 2024 which showed YESI at anastomosis, scattered diverticuli. CEA was 0.8 from 2024. Last CT scan of the A/P was in 2024, showing YESI. Last CT Chest was in 2025, showing bronchiectasis and small pulmonary nodules.    Medications:   Current Outpatient Medications:     levothyroxine (SYNTHROID) 88 mcg tablet, Take 88 mcg by mouth daily., Disp: , Rfl:     omeprazole (PriLOSEC) 20 mg capsule, Take 20 mg by mouth daily before breakfast., Disp: , Rfl:     propranolol LA (INDERAL LA) 60 mg 24 hr capsule, Take 60 mg by mouth daily., Disp: , Rfl:     simvastatin (ZOCOR) 20 mg tablet, Take 20 mg by mouth nightly., Disp: , Rfl:     TRICOR 48 mg tablet, Take 48 mg by mouth daily., Disp: , Rfl:     benzonatate (TESSALON) 100 mg capsule, take 1 capsule by mouth three times a day if needed for cough, Disp: , Rfl:     fluticasone propionate (FLONASE) 50 mcg/actuation nasal spray, Administer 2 sprays into affected nostril(s) daily., Disp: , Rfl:     Past Medical History:  has a past medical history of Arthritis, Disease of thyroid gland, Diverticulitis of colon, Dyslipidemia, Essential tremor, GERD (gastroesophageal reflux disease), Hypothyroidism, NICOLAS on CPAP, and Seasonal allergies.  Past Surgical History:  has a past surgical history that includes Knee surgery (Left) and Colonoscopy.  Social History:   Social History     Tobacco Use    Smoking status: Former     Current packs/day: 0.00     Average packs/day: 1 pack/day for 20.0 years (20.0 ttl pk-yrs)     Types: Cigarettes     Start date:      Quit date:      Years since quittin.4  "   Smokeless tobacco: Never   Substance Use Topics    Alcohol use: Yes     Alcohol/week: 1.0 standard drink of alcohol     Types: 1 Glasses of wine per week    Drug use: Never     Family History:   Family History   Problem Relation Name Age of Onset    Stroke Biological Father      Diabetes Biological Father      Prostate cancer Biological Brother      Stomach cancer Maternal Grandmother       Allergies: has no known allergies.     Review of Systems   Constitutional: Negative for activity change.   HENT: Negative for congestion.    Respiratory: Negative for apnea, chest tightness and wheezing.    Cardiovascular: Negative for chest pain, palpitations and leg swelling.   Gastrointestinal: Negative for nausea.   Endocrine: Negative for polyuria.   Skin: Negative for color change.   Allergic/Immunologic: Negative for immunocompromised state.   Neurological: Negative for dizziness.   Hematological: Negative for adenopathy.   The following have been reviewed and updated as appropriate in this visit:   Allergies  Meds  Problems         Objective:  Vitals: Visit Vitals  Ht 1.778 m (5' 10\")   Wt 102 kg (225 lb)   BMI 32.28 kg/m²     Constitutional: He is oriented to person, place, and time. He appears well-developed and well-nourished.   HENT:   Head: Normocephalic and atraumatic.   Eyes: Conjunctivae are normal.   Neck: Normal range of motion.   Cardiovascular: Normal rate.    Pulmonary/Chest: Effort normal.   Musculoskeletal: Normal range of motion. He exhibits no edema.   Neurological: He is alert and oriented to person, place, and time.   Skin: Skin is warm and dry.   Psychiatric: He has a normal mood and affect.   Nursing note and vitals reviewed.      Abdominal Exam: Soft nontender nondistended      Rectal Exam: Normal tone no abnormality sigmoidoscopic evaluation was carried out to the mid descending colon anastomosis is widely patent no irregularity      ASSESSMENT/PLAN:  Malignant neoplasm of sigmoid colon " (CMS/HCC)  Malignant neoplasm of sigmoid colon    Pathology: pT2 pN0; (0/38) Lymph Nodes     12/14/2023: XI Robotic Anterior Resection    Hospital Stay 12/14/2023 - 12/16/2023     CT: 11/7/2023, 02/24/2025 (C), 04/22/2025 (C)    FFC: 11/7/2023    FFS: 11/21/2023    CEA: 0.8 (04/01/2024)      CT: 11/12/2024  No evidence for metastatic disease in the abdomen or pelvis    FFC: 12/11/2024   - Diverticulosis in the descending colon.  - No specimens collected.    He is currently YESI functionally is doing very well and very pleased with things ordered a CEA and a PSA we will see him back here in 3 months time.      Miguel Craig MD    This note was dictated by Dr. Miguel Craig, but not proofread. We apologize for any typographical errors due to dictation software.

## 2025-06-10 NOTE — ASSESSMENT & PLAN NOTE
Malignant neoplasm of sigmoid colon    Pathology: pT2 pN0; (0/38) Lymph Nodes     12/14/2023: XI Robotic Anterior Resection    Hospital Stay 12/14/2023 - 12/16/2023     CT: 11/7/2023, 02/24/2025 (C), 04/22/2025 (C)    FFC: 11/7/2023    FFS: 11/21/2023    CEA: 0.8 (04/01/2024)      CT: 11/12/2024  No evidence for metastatic disease in the abdomen or pelvis    FFC: 12/11/2024   - Diverticulosis in the descending colon.  - No specimens collected.    He is currently YESI functionally is doing very well and very pleased with things ordered a CEA and a PSA we will see him back here in 3 months time.

## (undated) DEVICE — SUTURE POLYSORB 3-0 VIOLET 12X18 PRE-CUT

## (undated) DEVICE — SUTURE POLYSORB 0 VIOLET 6X18 PRE-CUT

## (undated) DEVICE — PORT ACCESS 5MM W/ BLADELESS OPTICAL TIP 120MM LONG

## (undated) DEVICE — CONNECTOR 1/2X3/8X3/8

## (undated) DEVICE — Device

## (undated) DEVICE — COVER LIGHTHANDLE (STERILE SINGLE PA

## (undated) DEVICE — GAUZE VASELINE PKG 1/2X72IN

## (undated) DEVICE — TIPS SCISSOR MICROLINE LAP

## (undated) DEVICE — SOLN BETADINE 4 OZ

## (undated) DEVICE — TRAY URINE METER FOLEY NON-SILVER

## (undated) DEVICE — SUTURE VICRYL ENDOLOOP   0   EJ10G

## (undated) DEVICE — TAPE DURAPORE 3IN

## (undated) DEVICE — TIP SUCTION YANKAUER

## (undated) DEVICE — MARKER SURGICAL SKIN

## (undated) DEVICE — STAPLER SUREFORM 45

## (undated) DEVICE — SANI-SERVE SLUSH DRAPE SUBSTIT

## (undated) DEVICE — PENCIL HAND ELECTROSURGICAL

## (undated) DEVICE — PACK RFID LAP COLON

## (undated) DEVICE — SOLN IRRIG .9%SOD 1000ML

## (undated) DEVICE — SEALER VESSEL DAVINCI XI DISP

## (undated) DEVICE — SPONGE LAP 18X18 SAFE-T RFID ENHANCED XRAY

## (undated) DEVICE — SUTURE MAXON 1 GREEN 1X36 GS-25

## (undated) DEVICE — SHEET DRAPE 40X58 STERILE

## (undated) DEVICE — RELOAD SUREFORM 45 4.3 GREEN 6 ROW

## (undated) DEVICE — DRAPE ARM DAVINCI XI

## (undated) DEVICE — SUTURE MONOCRYL 4-0 Y426H PS-2 27IN

## (undated) DEVICE — TIP MICROLINE BABCOCK GRASPER 5MM DISPOSABLE

## (undated) DEVICE — MANIFOLD FOUR PORT NEPTUNE

## (undated) DEVICE — NEEDLE/VERRES 120MM    PN120

## (undated) DEVICE — SUTURE VICRYL 3-0 J316H SH VIOLET

## (undated) DEVICE — STAPLER 28 MM DST

## (undated) DEVICE — BAG PRESSURE INFUSOR 1000CC REUSEABLE

## (undated) DEVICE — DRAPE COLUMN DAVINCI XI

## (undated) DEVICE — BANDAGE COMPRESSION W6INXL5.5YD KNIT HIGHLY ABSORBENT ELASTI

## (undated) DEVICE — SYRINGE BULB IRRIGATION

## (undated) DEVICE — LITE GLOVE 1 HANDLE COVER

## (undated) DEVICE — SYSTEM VISUALIZATION CLEARIFY

## (undated) DEVICE — SOLN IRRIG STERILE WATER 3L

## (undated) DEVICE — TROCAR 12MM X 100MM ENDOPATH XCEL WITH OPTIVIEW TECHNOLOGY B

## (undated) DEVICE — STERISTRIP 1/2INX4IN

## (undated) DEVICE — DRAPE 3/4 REINFORCED

## (undated) DEVICE — ENDOWRIST SUCTION/IRRIGATOR 8MM

## (undated) DEVICE — PAD POSITIONING XL W/ARM PROTECTORS

## (undated) DEVICE — GLOVE SZ 7.5 LINER PROTEXIS PI BL

## (undated) DEVICE — PINPOINT ICG PAQ

## (undated) DEVICE — COVER CAMERA LIGHT HANDLE

## (undated) DEVICE — TRAY SKIN SCRUB

## (undated) DEVICE — KIT NEEDLE PAD LRG LIGHT GLOVE

## (undated) DEVICE — SUTURE MAXON 2-0 GREEN 1X30 V-20

## (undated) DEVICE — UNDERPAD 24 X 17.5 STANDARD ABSORB DISBOSABLE

## (undated) DEVICE — SET AIRSEAL FILTERED TUBE SET

## (undated) DEVICE — TOWEL SURGICAL W17XL27IN BLUE COTTON STANDARD PREWASHED DELI

## (undated) DEVICE — TUBING CYSTO BLADDER IRRIG

## (undated) DEVICE — GOWN SURGICAL REINFORCED XX-LARGE

## (undated) DEVICE — SOLUTION ELECTROLUBE ANTI-STICK

## (undated) DEVICE — RENEW LONG FENESTRATED GRASPER TIP, DISPOSABLE

## (undated) DEVICE — MANIFOLD SINGLE PORT NEPTUNE